# Patient Record
Sex: FEMALE | Race: WHITE | Employment: UNEMPLOYED | URBAN - METROPOLITAN AREA
[De-identification: names, ages, dates, MRNs, and addresses within clinical notes are randomized per-mention and may not be internally consistent; named-entity substitution may affect disease eponyms.]

---

## 2016-02-02 LAB — T. PALLIDUM, EXTERNAL: NORMAL

## 2016-06-08 LAB
CHLAMYDIA, EXTERNAL: NEGATIVE
N. GONORRHEA, EXTERNAL: NEGATIVE

## 2016-09-09 LAB
HBSAG, EXTERNAL: NEGATIVE
HIV, EXTERNAL: NONREACTIVE
RUBELLA, EXTERNAL: NORMAL
TYPE, ABO & RH, EXTERNAL: NORMAL

## 2017-02-02 LAB
ANTIBODY SCREEN, EXTERNAL: NORMAL
T. PALLIDUM, EXTERNAL: NEGATIVE

## 2017-03-29 LAB — GRBS, EXTERNAL: NORMAL

## 2017-04-28 ENCOUNTER — ANESTHESIA EVENT (OUTPATIENT)
Dept: LABOR AND DELIVERY | Age: 32
DRG: 540 | End: 2017-04-28
Payer: MEDICAID

## 2017-04-28 ENCOUNTER — HOSPITAL ENCOUNTER (INPATIENT)
Age: 32
LOS: 3 days | Discharge: HOME | DRG: 540 | End: 2017-05-01
Attending: OBSTETRICS & GYNECOLOGY | Admitting: OBSTETRICS & GYNECOLOGY
Payer: MEDICAID

## 2017-04-28 ENCOUNTER — ANESTHESIA (OUTPATIENT)
Dept: LABOR AND DELIVERY | Age: 32
DRG: 540 | End: 2017-04-28
Payer: MEDICAID

## 2017-04-28 PROBLEM — Z34.90 PREGNANCY: Status: ACTIVE | Noted: 2017-04-28

## 2017-04-28 LAB
ABO + RH BLD: NORMAL
BASOPHILS # BLD AUTO: 0 K/UL (ref 0–0.1)
BASOPHILS # BLD: 0 % (ref 0–1)
BLOOD GROUP ANTIBODIES SERPL: NORMAL
EOSINOPHIL # BLD: 0.1 K/UL (ref 0–0.4)
EOSINOPHIL NFR BLD: 1 % (ref 0–7)
ERYTHROCYTE [DISTWIDTH] IN BLOOD BY AUTOMATED COUNT: 14.3 % (ref 11.5–14.5)
HCT VFR BLD AUTO: 35.5 % (ref 35–47)
HGB BLD-MCNC: 11.9 G/DL (ref 11.5–16)
LYMPHOCYTES # BLD AUTO: 20 % (ref 12–49)
LYMPHOCYTES # BLD: 3 K/UL (ref 0.8–3.5)
MCH RBC QN AUTO: 30.1 PG (ref 26–34)
MCHC RBC AUTO-ENTMCNC: 33.5 G/DL (ref 30–36.5)
MCV RBC AUTO: 89.6 FL (ref 80–99)
MONOCYTES # BLD: 0.8 K/UL (ref 0–1)
MONOCYTES NFR BLD AUTO: 6 % (ref 5–13)
NEUTS SEG # BLD: 10.9 K/UL (ref 1.8–8)
NEUTS SEG NFR BLD AUTO: 73 % (ref 32–75)
PLATELET # BLD AUTO: 354 K/UL (ref 150–400)
RBC # BLD AUTO: 3.96 M/UL (ref 3.8–5.2)
SPECIMEN EXP DATE BLD: NORMAL
WBC # BLD AUTO: 14.8 K/UL (ref 3.6–11)

## 2017-04-28 PROCEDURE — 77010026065 HC OXYGEN MINIMUM MEDICAL AIR

## 2017-04-28 PROCEDURE — 74011250636 HC RX REV CODE- 250/636: Performed by: OBSTETRICS & GYNECOLOGY

## 2017-04-28 PROCEDURE — 76010000391 HC C SECN FIRST 1 HR: Performed by: OBSTETRICS & GYNECOLOGY

## 2017-04-28 PROCEDURE — 77030002933 HC SUT MCRYL J&J -A

## 2017-04-28 PROCEDURE — 65410000002 HC RM PRIVATE OB

## 2017-04-28 PROCEDURE — 77030031139 HC SUT VCRL2 J&J -A

## 2017-04-28 PROCEDURE — 75410000002 HC LABOR FEE PER 1 HR

## 2017-04-28 PROCEDURE — 76060000033 HC ANESTHESIA 1 TO 1.5 HR: Performed by: OBSTETRICS & GYNECOLOGY

## 2017-04-28 PROCEDURE — 74011250636 HC RX REV CODE- 250/636

## 2017-04-28 PROCEDURE — 74011000250 HC RX REV CODE- 250

## 2017-04-28 PROCEDURE — 74011250637 HC RX REV CODE- 250/637: Performed by: OBSTETRICS & GYNECOLOGY

## 2017-04-28 PROCEDURE — 76060000078 HC EPIDURAL ANESTHESIA: Performed by: OBSTETRICS & GYNECOLOGY

## 2017-04-28 PROCEDURE — 77030003666 HC NDL SPINAL BD -A: Performed by: ANESTHESIOLOGY

## 2017-04-28 PROCEDURE — 77030014125 HC TY EPDRL BBMI -B: Performed by: ANESTHESIOLOGY

## 2017-04-28 PROCEDURE — 76010000392 HC C SECN EA ADDL 0.5 HR: Performed by: OBSTETRICS & GYNECOLOGY

## 2017-04-28 PROCEDURE — 85025 COMPLETE CBC W/AUTO DIFF WBC: CPT | Performed by: OBSTETRICS & GYNECOLOGY

## 2017-04-28 PROCEDURE — 75410000003 HC RECOV DEL/VAG/CSECN EA 0.5 HR

## 2017-04-28 PROCEDURE — 86900 BLOOD TYPING SEROLOGIC ABO: CPT | Performed by: OBSTETRICS & GYNECOLOGY

## 2017-04-28 PROCEDURE — 36415 COLL VENOUS BLD VENIPUNCTURE: CPT | Performed by: OBSTETRICS & GYNECOLOGY

## 2017-04-28 RX ORDER — NALOXONE HYDROCHLORIDE 0.4 MG/ML
0.2 INJECTION, SOLUTION INTRAMUSCULAR; INTRAVENOUS; SUBCUTANEOUS
Status: DISCONTINUED | OUTPATIENT
Start: 2017-04-28 | End: 2017-05-01 | Stop reason: HOSPADM

## 2017-04-28 RX ORDER — OXYTOCIN/RINGER'S LACTATE 20/1000 ML
125-500 PLASTIC BAG, INJECTION (ML) INTRAVENOUS ONCE
Status: ACTIVE | OUTPATIENT
Start: 2017-04-28 | End: 2017-04-29

## 2017-04-28 RX ORDER — PHENYLEPHRINE HCL IN 0.9% NACL 0.4MG/10ML
SYRINGE (ML) INTRAVENOUS AS NEEDED
Status: DISCONTINUED | OUTPATIENT
Start: 2017-04-28 | End: 2017-04-28 | Stop reason: HOSPADM

## 2017-04-28 RX ORDER — ACETAMINOPHEN 500 MG
1000 TABLET ORAL
COMMUNITY
End: 2017-05-01

## 2017-04-28 RX ORDER — SODIUM CHLORIDE 0.9 % (FLUSH) 0.9 %
5-10 SYRINGE (ML) INJECTION AS NEEDED
Status: DISCONTINUED | OUTPATIENT
Start: 2017-04-28 | End: 2017-05-01 | Stop reason: HOSPADM

## 2017-04-28 RX ORDER — ONDANSETRON 2 MG/ML
INJECTION INTRAMUSCULAR; INTRAVENOUS AS NEEDED
Status: DISCONTINUED | OUTPATIENT
Start: 2017-04-28 | End: 2017-04-28 | Stop reason: HOSPADM

## 2017-04-28 RX ORDER — CEFAZOLIN SODIUM IN 0.9 % NACL 2 G/100 ML
PLASTIC BAG, INJECTION (ML) INTRAVENOUS
Status: COMPLETED
Start: 2017-04-28 | End: 2017-04-28

## 2017-04-28 RX ORDER — SODIUM CHLORIDE 0.9 % (FLUSH) 0.9 %
5-10 SYRINGE (ML) INJECTION AS NEEDED
Status: DISCONTINUED | OUTPATIENT
Start: 2017-04-28 | End: 2017-04-28 | Stop reason: HOSPADM

## 2017-04-28 RX ORDER — SODIUM CHLORIDE 0.9 % (FLUSH) 0.9 %
5-10 SYRINGE (ML) INJECTION EVERY 8 HOURS
Status: DISCONTINUED | OUTPATIENT
Start: 2017-04-28 | End: 2017-04-28 | Stop reason: HOSPADM

## 2017-04-28 RX ORDER — DEXAMETHASONE SODIUM PHOSPHATE 4 MG/ML
INJECTION, SOLUTION INTRA-ARTICULAR; INTRALESIONAL; INTRAMUSCULAR; INTRAVENOUS; SOFT TISSUE AS NEEDED
Status: DISCONTINUED | OUTPATIENT
Start: 2017-04-28 | End: 2017-04-28 | Stop reason: HOSPADM

## 2017-04-28 RX ORDER — SODIUM CHLORIDE, SODIUM LACTATE, POTASSIUM CHLORIDE, CALCIUM CHLORIDE 600; 310; 30; 20 MG/100ML; MG/100ML; MG/100ML; MG/100ML
1000 INJECTION, SOLUTION INTRAVENOUS CONTINUOUS
Status: DISCONTINUED | OUTPATIENT
Start: 2017-04-28 | End: 2017-04-28 | Stop reason: HOSPADM

## 2017-04-28 RX ORDER — ACETAMINOPHEN 10 MG/ML
INJECTION, SOLUTION INTRAVENOUS AS NEEDED
Status: DISCONTINUED | OUTPATIENT
Start: 2017-04-28 | End: 2017-04-28 | Stop reason: HOSPADM

## 2017-04-28 RX ORDER — SODIUM CHLORIDE, SODIUM LACTATE, POTASSIUM CHLORIDE, CALCIUM CHLORIDE 600; 310; 30; 20 MG/100ML; MG/100ML; MG/100ML; MG/100ML
125 INJECTION, SOLUTION INTRAVENOUS CONTINUOUS
Status: DISCONTINUED | OUTPATIENT
Start: 2017-04-28 | End: 2017-05-01 | Stop reason: HOSPADM

## 2017-04-28 RX ORDER — SODIUM CHLORIDE 0.9 % (FLUSH) 0.9 %
5-10 SYRINGE (ML) INJECTION EVERY 8 HOURS
Status: DISCONTINUED | OUTPATIENT
Start: 2017-04-28 | End: 2017-05-01 | Stop reason: HOSPADM

## 2017-04-28 RX ORDER — KETOROLAC TROMETHAMINE 30 MG/ML
30 INJECTION, SOLUTION INTRAMUSCULAR; INTRAVENOUS
Status: COMPLETED | OUTPATIENT
Start: 2017-04-28 | End: 2017-04-28

## 2017-04-28 RX ORDER — FENTANYL CITRATE 50 UG/ML
INJECTION, SOLUTION INTRAMUSCULAR; INTRAVENOUS AS NEEDED
Status: DISCONTINUED | OUTPATIENT
Start: 2017-04-28 | End: 2017-04-28 | Stop reason: HOSPADM

## 2017-04-28 RX ORDER — NALOXONE HYDROCHLORIDE 0.4 MG/ML
0.4 INJECTION, SOLUTION INTRAMUSCULAR; INTRAVENOUS; SUBCUTANEOUS AS NEEDED
Status: DISCONTINUED | OUTPATIENT
Start: 2017-04-28 | End: 2017-05-01 | Stop reason: HOSPADM

## 2017-04-28 RX ORDER — OXYTOCIN 10 [USP'U]/ML
INJECTION, SOLUTION INTRAMUSCULAR; INTRAVENOUS
Status: DISPENSED
Start: 2017-04-28 | End: 2017-04-28

## 2017-04-28 RX ORDER — OXYTOCIN 10 [USP'U]/ML
INJECTION, SOLUTION INTRAMUSCULAR; INTRAVENOUS AS NEEDED
Status: DISCONTINUED | OUTPATIENT
Start: 2017-04-28 | End: 2017-04-28 | Stop reason: HOSPADM

## 2017-04-28 RX ORDER — SIMETHICONE 80 MG
80 TABLET,CHEWABLE ORAL AS NEEDED
Status: DISCONTINUED | OUTPATIENT
Start: 2017-04-28 | End: 2017-05-01 | Stop reason: HOSPADM

## 2017-04-28 RX ORDER — GLYCOPYRROLATE 0.2 MG/ML
INJECTION INTRAMUSCULAR; INTRAVENOUS AS NEEDED
Status: DISCONTINUED | OUTPATIENT
Start: 2017-04-28 | End: 2017-04-28 | Stop reason: HOSPADM

## 2017-04-28 RX ORDER — OXYCODONE AND ACETAMINOPHEN 5; 325 MG/1; MG/1
1 TABLET ORAL
Status: DISCONTINUED | OUTPATIENT
Start: 2017-04-28 | End: 2017-04-30

## 2017-04-28 RX ORDER — BISMUTH SUBSALICYLATE 262 MG
2 TABLET,CHEWABLE ORAL DAILY
COMMUNITY
End: 2018-08-06

## 2017-04-28 RX ORDER — IBUPROFEN 400 MG/1
800 TABLET ORAL EVERY 8 HOURS
Status: DISCONTINUED | OUTPATIENT
Start: 2017-04-28 | End: 2017-05-01 | Stop reason: HOSPADM

## 2017-04-28 RX ADMIN — IBUPROFEN 800 MG: 400 TABLET, FILM COATED ORAL at 18:34

## 2017-04-28 RX ADMIN — KETOROLAC TROMETHAMINE 30 MG: 30 INJECTION, SOLUTION INTRAMUSCULAR at 11:56

## 2017-04-28 RX ADMIN — DEXAMETHASONE SODIUM PHOSPHATE 4 MG: 4 INJECTION, SOLUTION INTRA-ARTICULAR; INTRALESIONAL; INTRAMUSCULAR; INTRAVENOUS; SOFT TISSUE at 09:45

## 2017-04-28 RX ADMIN — OXYCODONE HYDROCHLORIDE AND ACETAMINOPHEN 1 TABLET: 5; 325 TABLET ORAL at 21:35

## 2017-04-28 RX ADMIN — ONDANSETRON 4 MG: 2 INJECTION INTRAMUSCULAR; INTRAVENOUS at 09:45

## 2017-04-28 RX ADMIN — OXYCODONE HYDROCHLORIDE AND ACETAMINOPHEN 1 TABLET: 5; 325 TABLET ORAL at 17:27

## 2017-04-28 RX ADMIN — FENTANYL CITRATE 75 MCG: 50 INJECTION, SOLUTION INTRAMUSCULAR; INTRAVENOUS at 10:24

## 2017-04-28 RX ADMIN — Medication 40 MCG: at 09:51

## 2017-04-28 RX ADMIN — SODIUM CHLORIDE, SODIUM LACTATE, POTASSIUM CHLORIDE, AND CALCIUM CHLORIDE 1000 ML: 600; 310; 30; 20 INJECTION, SOLUTION INTRAVENOUS at 07:40

## 2017-04-28 RX ADMIN — GLYCOPYRROLATE 0.1 MG: 0.2 INJECTION INTRAMUSCULAR; INTRAVENOUS at 10:09

## 2017-04-28 RX ADMIN — FENTANYL CITRATE 25 MCG: 50 INJECTION, SOLUTION INTRAMUSCULAR; INTRAVENOUS at 10:10

## 2017-04-28 RX ADMIN — ACETAMINOPHEN 1000 MG: 10 INJECTION, SOLUTION INTRAVENOUS at 10:22

## 2017-04-28 RX ADMIN — GLYCOPYRROLATE 0.1 MG: 0.2 INJECTION INTRAMUSCULAR; INTRAVENOUS at 10:10

## 2017-04-28 RX ADMIN — SODIUM CHLORIDE, SODIUM LACTATE, POTASSIUM CHLORIDE, AND CALCIUM CHLORIDE 1000 ML: 600; 310; 30; 20 INJECTION, SOLUTION INTRAVENOUS at 09:25

## 2017-04-28 RX ADMIN — CEFAZOLIN 2 G: 10 INJECTION, POWDER, FOR SOLUTION INTRAVENOUS; PARENTERAL at 09:24

## 2017-04-28 RX ADMIN — SODIUM CHLORIDE, SODIUM LACTATE, POTASSIUM CHLORIDE, AND CALCIUM CHLORIDE 125 ML/HR: 600; 310; 30; 20 INJECTION, SOLUTION INTRAVENOUS at 14:28

## 2017-04-28 RX ADMIN — OXYTOCIN 20 UNITS: 10 INJECTION, SOLUTION INTRAMUSCULAR; INTRAVENOUS at 10:01

## 2017-04-28 RX ADMIN — SODIUM CHLORIDE, SODIUM LACTATE, POTASSIUM CHLORIDE, AND CALCIUM CHLORIDE 1000 ML: 600; 310; 30; 20 INJECTION, SOLUTION INTRAVENOUS at 06:43

## 2017-04-28 NOTE — PROGRESS NOTES
Report received from NEVIN Valencia RN. Care assumed at this time. Dr. Jaki Gomez has seen patient. 1090 Dr. Pedro Banerjee in room speaking to patient. 3665 pt walking back to the OR    1038 Pt to room 3302 for post partum recovery    1100 Report given to JOSH Boyd. Care turned over at this time.

## 2017-04-28 NOTE — IP AVS SNAPSHOT
[address] [facility] 
[phone] Patient: [facility] MRN: [id number] GBX:[id number]/[id number] You are allergic to the following Allergen Reactions Wellbutrin (Bupropion Hcl) Hives Recent Documentation Height Weight Breastfeeding? BMI OB Status Smoking Status 1.676 m 99.8 kg Unknown 35.51 kg/m2 Recent pregnancy Former Smoker Emergency Contacts Name Discharge Info Relation Home Work Mobile Tigist Corral  Mother [14] [phone] Jayce Houser DISCHARGE CAREGIVER [3] Boyfriend [17]   [phone] About your hospitalization You were admitted on:  April 28, 2017 You last received care in the:  [facility] You were discharged on:  May 1, 2017 Unit phone number:  [phone] Why you were hospitalized Your primary diagnosis was:  Not on File Your diagnoses also included:  Pregnancy Providers Seen During Your Hospitalizations Provider Role Specialty Primary office phone Diego Cazares MD Attending Provider Obstetrics & Gynecology [phone] Your Primary Care Physician (PCP) Primary Care Physician Office Phone Office Fax UNKNOWN, PROVIDER ** None ** ** None ** Follow-up Information Follow up With Details Comments Contact Info Provider Unknown   Patient not available to ask Diego Cazares MD In 1 week Follow up with [facility] in 1 week for blood pressure check and 6 weeks post partum; Call to schedule both appointments. [address] [facility] 
[phone] Current Discharge Medication List  
  
START taking these medications Dose & Instructions Dispensing Information Comments Morning Noon Evening Bedtime  
 docusate sodium 100 mg capsule Commonly known as:  Lucio Blanton Your last dose was: Your next dose is: Dose:  100 mg Take 1 Cap by mouth two (2) times a day for 30 days. Quantity:  60 Cap Refills:  0  
     
   
   
   
  
 ibuprofen 600 mg tablet Commonly known as:  MOTRIN Your last dose was: Your next dose is:    
   
   
 Dose:  600 mg Take 1 Tab by mouth every six (6) hours as needed for Pain. Quantity:  40 Tab Refills:  1  
     
   
   
   
  
 oxyCODONE-acetaminophen 5-325 mg per tablet Commonly known as:  PERCOCET Your last dose was: Your next dose is:    
   
   
 Dose:  1-2 Tab Take 1-2 Tabs by mouth every six (6) hours as needed for Pain. Max Daily Amount: 8 Tabs. Quantity:  40 Tab Refills:  0 CONTINUE these medications which have NOT CHANGED Dose & Instructions Dispensing Information Comments Morning Noon Evening Bedtime  
 multivitamin tablet Commonly known as:  ONE A DAY Your last dose was: Your next dose is:    
   
   
 Dose:  2 Tab Take 2 Tabs by mouth daily. Refills:  0 STOP taking these medications   
 acetaminophen 500 mg tablet Commonly known as:  TYLENOL Where to Get Your Medications Information on where to get these meds will be given to you by the nurse or doctor. ! Ask your nurse or doctor about these medications  
  docusate sodium 100 mg capsule  
 ibuprofen 600 mg tablet  
 oxyCODONE-acetaminophen 5-325 mg per tablet Discharge Instructions POST DELIVERY DISCHARGE INSTRUCTIONS Name: Suzette Delgado YOB: 1985 Primary Diagnosis: Active Problems: 
  Pregnancy (4/28/2017) General:  
 
Diet/Diet Restrictions: 
· Eight 8-ounce glasses of fluid daily (water, juices); avoid excessive caffeine intake. · Meals/snacks as desired which are high in fiber and carbohydrates and low in fat and cholesterol. Medications:  
 
 
 
Physical Activity / Restrictions / Safety: · Avoid heavy lifting, no more that 8 lbs. For 2-3 weeks;  
· Limit use of stairs to 2 times daily for the first week home. · No driving for one week. · Avoid intercourse 4-6 weeks, no douching or tampon use. · Check with obstetrician before starting or resuming an exercise program.   
 
Discharge Instructions/Special Treatment/Home Care Needs:  
 
· Continue prenatal vitamins. · Continue to use squirt bottle with warm water on your episiotomy after each bathroom use until bleeding stops. · If steri-strips applied to your incision, remove in 7-10 days. Call your doctor for the following: · Fever over 101 degrees by mouth. · Vaginal bleeding heavier than a normal menstrual period or clots larger than a golf ball. · Red streaks or increased swelling of legs, painful red streaks on your breast. 
· Painful urination, constipation and increased pain or swelling or discharge with your incision. · If you feel extremely anxious or overwhelmed. · If you have thoughts of harming yourself and/or your baby. Pain Management:  
 
· Take Acetaminophen (Tylenol) or Ibuprofen (Advil, Motrin), as directed for pain. · Use a warm Sitz bath 3 times daily to relieve episiotomy or hemorrhoidal discomfort. · For hemorrhoidal discomfort, use Tucks and Anusol cream as needed and directed. · Heating pad to  incision as needed. Follow-Up Care:  
 
Appointment with MD: Follow-up Appointments Procedures  FOLLOW UP VISIT Appointment in: One Week 1 week and 6 weeks 1 week and 6 weeks Standing Status:   Standing Number of Occurrences:   1 Order Specific Question:   Appointment in Answer: One Week Telephone number: [phone] Signed By: Cisco Taylor MD                                                                                                   Date: 2017 Time: 10:00 AM 
 
 Delivery (Postpartum Care): After Your Visit Your Care Instructions Your baby was delivered through a cut, called an incision, in your belly. This surgery is called a  delivery, or a . After childbirth (postpartum period), your body goes through many changes. In the next few weeks, your body will slowly heal. It can take 4 weeks or more for the incision from your surgery to heal. It is easy to get too tired and overwhelmed during the first weeks after your baby is born. You may feel emotional during this time. Changes in your hormones can shift your mood without warning. It is easy to get too tired and overwhelmed during the first weeks after childbirth. Take it easy on yourself. You may find it hard to meet the extra demands on your energy and time. Get rest whenever you can, accept help from others, and eat well and drink plenty of fluids. After a , you may have gas or need to burp a lot. You may have some light vaginal bleeding or spotting for up to 6 weeks after surgery. It is normal to have pain in the incision area off and on during the next 6 months. No driving for 1 week after delivery. No heavy lifting. No more than 8 lbs or the size of baby for 2-3 weeks. Limit use of stairs. 1-2 times per day for the first week after delivery. Follow-up care is a key part of your treatment and safety. Be sure to make and go to all appointments, and call your doctor if you are having problems. Its also a good idea to know your test results and keep a list of the medicines you take. Around 4 to 6 weeks after your baby's birth, you will have a follow-up visit with your doctor. This visit is your time to talk to your doctor about anything you are concerned or curious about. Keep a list of questions to bring to your postpartum visit. Your questions might be about: 
Changes in your breasts, such as lumps or soreness. When to expect your menstrual period to start again. What form of birth control is best for you. Weight you have put on during the pregnancy. Exercise options. What foods and drinks are best for you, especially if you are breast-feeding. Problems you might be having with breast-feeding. When you can have sex. Some women may want to talk about lubricants for the vagina. Any feelings of sadness or restlessness that you are having. How can you care for yourself at home? Be sure that you understand any instructions your doctor has given you after surgery. This will guide your activities and tell you what to watch for in the next few weeks. Vaginal bleeding and cramps After delivery, you will have a bloody discharge from the vagina. This will turn pink within a week and then white or yellow after about 10 days. It may last for 2 to 4 weeks or longer, until the uterus has healed. You may have cramps for the first few days after childbirth. These are normal and occur as the uterus shrinks to normal size. Take an over-the-counter pain medicine, such as acetaminophen (Tylenol), ibuprofen (Advil, Motrin), or naproxen (Aleve), for cramps. Read and follow all instructions on the label. Do not take aspirin, because it can cause more bleeding. Take other medicines exactly as prescribed. Call your doctor if you have any problems with your medicine. Incision You may have had staples removed while you were in the hospital. Keep the area clean, and wash it with water and mild soap. If you had stitches, they should dissolve on their own and should not need to be removed. Follow your doctor's instructions for cleaning the stitched area. If you have steri-strips (tape) the will come off on their own in 7-10 days. Breast-feeding and breast fullness Breast-feed your baby in positions that do not put pressure on your incision, such as side-lying or football-hold positions. Ask your nurse, doctor, midwife, or lactation specialist to show you these positions if you do not know what they are. Your breasts may overfill (engorge) in the first few days after delivery. To help milk flow and to relieve pain, warm your breasts in the shower or by using warm, moist towels before nursing. Express a small amount of milk to soften your breast near the nipple and then feed your baby. Put an ice or cold pack on your breast for a few minutes after nursing to reduce swelling and pain. Put a thin cloth between the ice pack and your skin. If you are not nursing, do not put warmth on your breasts or touch your breasts. Wear a tight bra or sports bra, and use an ice or cold pack on your breasts to reduce swelling and pain. Breast fullness usually lasts 3 to 4 days. Activity Eat a balanced diet. Do not try to lose weight by cutting calories. Keep taking your prenatal vitamins, or take a multivitamin. Get as much rest as you can. Try to take naps when your baby sleeps during the day. Get help with household chores from family or friends, if you can. Do not try to do it all yourself. Get some gentle exercise, such as walking, every day. Do not lift more than 10 pounds for the next 4 to 6 weeks. Do not have sex or use tampons until you have stopped bleeding and at least 4 to 6 weeks have passed since you gave birth. Talk to your doctor about birth control. You can get pregnant even before your period returns. Also, you can get pregnant while you are breast-feeding. Mental health It is normal to have some sadness, anxiety, sleeplessness, and mood swings after you go home. If you feel upset or hopeless for more than a few days, talk to your doctor. If you have any thoughts of hurting yourself or anyone elseincluding your babycall 911 or go to the emergency room. Many women get the \"baby blues\" during the first few days after childbirth. The \"baby blues\" usually peak around the fourth day and then ease up in less than 2 weeks.  If you have the \"baby blues\" for more than a few days, or if you have thoughts of hurting yourself or your baby, call your doctor right away. Constipation and hemorrhoids Drink plenty of fluids (8 to 10 glasses a day), especially water. · Eat plenty of fiber each day to avoid constipation. Include foods such as whole-grain breads and cereals, raw vegetables, raw and dried fruits, and beans. · If you have hemorrhoids or swelling or pain around the opening of your vagina, try using cold and heat. You can put ice or a cold pack on the area for 10 to 20 minutes at a time. Put a thin cloth between the ice and your skin. Also try sitting in a few inches of warm water (sitz bath) 3 times a day and after bowel movements. Drink plenty of fluids, enough so that your urine is light yellow or clear like water. If you have kidney, heart, or liver disease and have to limit fluids, talk with your doctor before you increase the amount of fluids you drink. When should you call for help? Call 911 anytime you think you may need emergency care. For example, call if: 
You are thinking of hurting yourself, your baby, or anyone else. You have sudden, severe pain in your belly. You pass out (lose consciousness). Call your doctor now or seek immediate medical care if: 
You have severe vaginal bleeding. You are passing blood clots and soaking through a pad each hour for 2 or more hours. Your vaginal bleeding seems to be getting heavier or is still bright red 4 days after delivery, or you pass blood clots larger than the size of a golf ball. You have increased redness, heat, or drainage in the incision area. You are dizzy or lightheaded, or you feel like you may faint. You are vomiting or cannot keep fluids down. You have a fever. You have new or more belly pain. You pass tissue (not just blood). The incision seems to be pulling open or starts bleeding. Your vaginal discharge smells bad. Your belly feels more tender or full and hard. You have pain or redness in one or both breasts. You feel sad, anxious, or hopeless for more than a few days. Where can you learn more? Go to [employer] Enter F285 in the search box to learn more about \" Delivery: After Your Visit\". or 
  
Go to [employer] Enter Q518  in the search box to learn more about \"Postpartum Care: After Your Visit\". This care instruction is for use with your licensed healthcare professional. If you have questions about a medical condition or this instruction, always ask your healthcare professional. Care instructions adapted by Kenia Castillo (which disclaims liability or warranty for this information) from Patymariam Krystle, [address] [facility] . [employer] disclaims any warranty or liability for your use of this information. Follow up with [facility] in 1 week for blood pressure check and 6 weeks post partum; Call to schedule both appointments. Discharge Orders None Digg Announcement We are excited to announce that we are making your provider's discharge notes available to you in Digg. You will see these notes when they are completed and signed by the physician that discharged you from your recent hospital stay. If you have any questions or concerns about any information you see in Digg, please call the Health Information Department where you were seen or reach out to your Primary Care Provider for more information about your plan of care. Introducing [facility]! Kenia Castillo introduces Digg patient portal. Now you can access parts of your medical record, email your doctor's office, and request medication refills online. 1. In your internet browser, go to [email or web address] 2. Click on the First Time User? Click Here link in the Sign In box. You will see the New Member Sign Up page. 3. Enter your [employer] Access Code exactly as it appears below. You will not need to use this code after youve completed the sign-up process. If you do not sign up before the expiration date, you must request a new code. · [employer] Access Code: [email or web address] Expires: 7/30/2017 11:52 AM 
 
4. Enter the last four digits of your Social Security Number (xxxx) and Date of Birth (mm/dd/yyyy) as indicated and click Submit. You will be taken to the next sign-up page. 5. Create a [employer] ID. This will be your [employer] login ID and cannot be changed, so think of one that is secure and easy to remember. 6. Create a [employer] password. You can change your password at any time. 7. Enter your Password Reset Question and Answer. This can be used at a later time if you forget your password. 8. Enter your e-mail address. You will receive e-mail notification when new information is available in [address] 9. Click Sign Up. You can now view and download portions of your medical record. 10. Click the Download Summary menu link to download a portable copy of your medical information. If you have questions, please visit the Frequently Asked Questions section of the [employer] website. Remember, [employer] is NOT to be used for urgent needs. For medical emergencies, dial 911. Now available from your iPhone and Android! General Information Please provide this summary of care documentation to your next provider. Patient Signature:  ____________________________________________________________ Date:  ____________________________________________________________  
  
Lisa Macario Provider Signature:  ____________________________________________________________ Date:  ____________________________________________________________

## 2017-04-28 NOTE — ANESTHESIA PREPROCEDURE EVALUATION
Anesthetic History   No history of anesthetic complications            Review of Systems / Medical History  Patient summary reviewed, nursing notes reviewed and pertinent labs reviewed    Pulmonary  Within defined limits                 Neuro/Psych   Within defined limits           Cardiovascular  Within defined limits                Exercise tolerance: >4 METS     GI/Hepatic/Renal  Within defined limits              Endo/Other  Within defined limits           Other Findings              Physical Exam    Airway  Mallampati: II  TM Distance: 4 - 6 cm  Neck ROM: normal range of motion   Mouth opening: Normal     Cardiovascular  Regular rate and rhythm,  S1 and S2 normal,  no murmur, click, rub, or gallop             Dental  No notable dental hx       Pulmonary  Breath sounds clear to auscultation               Abdominal  GI exam deferred       Other Findings            Anesthetic Plan    ASA: 2  Anesthesia type: spinal            Anesthetic plan and risks discussed with: Patient

## 2017-04-28 NOTE — PROGRESS NOTES
TRANSFER - OUT REPORT:    Verbal report given to SUE Lyle(name) on April Duffer  being transferred to MIU(unit) for routine post - op       Report consisted of patients Situation, Background, Assessment and   Recommendations(SBAR). Information from the following report(s) SBAR, Kardex, OR Summary, Procedure Summary, Intake/Output, MAR and Recent Results was reviewed with the receiving nurse. Lines:   Peripheral IV 04/28/17 Left; Inner Forearm (Active)        Opportunity for questions and clarification was provided.

## 2017-04-28 NOTE — H&P
EDC:2017  EGA: 40 weeks, 2 days      Primary Provider:  Colette Nieto MD      History of Present Illness:   at 36 2/7 wks with lga fetus. efw almost 4500 gm. AC >99%. After long discussion of risks pt has opted for primary c-s. Patient's Prenatal Care with Doctor of Record Demetrius Baumann MD Notable For -    *Note: C/S scheduled to begin at 8am 17 at [facility]  Gestational hypertension  Polyhydramnios ____  Anemia on FE pregnant  LGA at 20 wks, repeat growth at 28 wk__98%__; repeat growth 36wks___  Smoker encouraged to quit pregnant   lab screening  Normal pregnancy primigravida  ROUTINE GYN no problems  Family History of Breast Cancer          Impression & Recommendations:    Problem # 1:  LGA at 20 wks, repeat growth at 28 wk__98%__; repeat growth 36wks___ (MTH-975.00) (QFL72-U68.62x0)  Will proceed with primary c-s. Review of Systems     Except as noted in the HPI, the review of systems is negative for General, Breast, , CV, Resp, GI, Endo, MS, Derm, Neuro, Psych, Eyes, ENT, Allergy and Heme. Allergies    * SSRI'S (Moderate)      Medications Removed from Medication List        Flowsheet View for Follow-up Visit     Estimated weeks of        gestation:  40 2/7      Vital Signs      Contractions:  no  NST:   reactive         Physical Exam     General           General appearance:  no acute distress    Psych           Orientation:  oriented to time, place, and person          Mood:  no appearance of anxiety, depression, or agitation    Abdomen           Abdomen:  gravid            Impression & Recommendations:    Problem # 1:  LGA at 20 wks, repeat growth at 28 wk__98%__; repeat growth 36wks___ (AWE-955.60) (DXO71-I15.62x0)  Will proceed with primary c-s.       Medications (at conclusion of this visit)    2017 ONE-A-DAY WOMENS PRENATAL 1 CAPS (PRENAT-FE CARBONYL-FA-OMEGA 3 CAPS) Prescribed by non 681/450 Chris Black MD  2017 * DOUBLE ELECTRIC BREAST PUMP Use as directed  Dx: Z39.1  02/16/2017 TYLENOL PM EXTRA STRENGTH 500-25 MG ORAL TABS (DIPHENHYDRAMINE-APAP (SLEEP)) Prescribed by non [facility] MD          LABORATORY DATA   TEST DATE RESULT   Group B Strep culture 03/29/2017 Negative                                   (Group B Strep Culture Result Field)   Blood Type 09/09/2016 O                                             (Blood Type Result Field)   Rh 09/09/2016 Positive                                   (Rh Result Field)   Rhogam Inj Given     Tdap Vaccine Given 02/02/2017 Vacc. [address]   Antibody Screen 02/02/2017 Negative   Rubella  [facility] Reference Ranges On or After 3/10/14                  <0.90              Non-immune      0.90 - 0.99     Equivocal      >0.99              Immune    [facility] Reference Ranges  Before 3/10/14           <5                 Non-immune             5 - 9               Equivocal            >9                 Immune  [facility] Reference Ranges       < Or = 0.90       Negative             0.91-1.09          Equivocal            > Or = 1.10       Positive   09/09/2016     5.96     TPA (T Pallidum Antibodies) 02/02/2017 Negative   Serology (RPR)     HBsAg 09/09/2016 Negative   HIV 09/09/2016 Non Reactive   Hemoglobin 04/05/2017 11.1   Hematocrit 04/05/2017 32.1   Platelets 87/20/0266 331 X10E3/UL   TSH 05/21/2009 1.089   Urine Culture 04/12/2017 Negative   GC DNA Probe 06/08/2016 Negative   Chlamydia DNA 06/08/2016 Negative   PAP 06/08/2016 NIL   Flu Vaccine Given 10/11/2016 Vacc.  [facility]   HGBA1C     HGB Electro     T4, Free     BG Fasting     GTT 1H 50G 02/02/2017 118   GTT 1H 100G     GTT 2H 100G     GTT 3H 100G     Glucose Plasma     CF Accept or Decline 09/09/2016 declined   CF Screen Result 09/09/2016 Declined   Nuchal Trans 10/11/2016 Declined   AFP Only     Tetra 11/09/2016 Declined   AFP Serum     CVS 09/09/2016 declined   AFP Amniotic     Amnio Karyo     FISH     GC Culture     Chlamydia Cult     Ureaplasma     Mycoplasma     WBC 04/05/2017 14.1 X10E3/UL   RBC 04/05/2017 3.60 X10E6/UL   MCV 04/05/2017 89   MCH 04/05/2017 30.8   MCHC RBC 04/05/2017 34.6     ULTRASOUND DATA   TEST DATE RESULT   Estimated Fetal Weight 04/19/2017 7140^8937 g&grams                                     Weight % 04/19/2017 90^90% %&percent                                                BRIGHT 04/19/2017 11.05^19.3 cm&centimeters                    BPP 04/19/2017 8^8 [n/a]&Not applicable   Cervical Length (mm)             Electronically signed by Mary Patel MD on 04/28/2017 at 9:02 AM    ________________________________________________________________________

## 2017-04-28 NOTE — ROUTINE PROCESS
Bedside shift change report given to JACKELINE Peralta (oncoming nurse) by JONAS Antoine RN (offgoing nurse). Report included the following information SBAR, Procedure Summary, Intake/Output, MAR and Recent Results.

## 2017-04-28 NOTE — OP NOTES
Operative Note    Name: April [facility]   Medical Record Number: [id number]   YOB: 1985  Today's Date: 2017      Pre-operative Diagnosis: Primary C/S LGA    Post-operative Diagnosis: Delivered at 1000    Operation: Low Cervical Transverse Procedure(s):   SECTION    Surgeon(s):  MD Haven Butler MD    Anesthesia: Spinal    Prophylactic Antibiotics: Ancef  DVT Prophylaxis: Sequential Compression Devices         Fetal Description: phillips     Birth Information:   Information for the patient's :  Nahed Mena april [[id number]]   Delivery of a 4.2 kg Female [1] infant on 2017 at 10:00 AM. Apgars were 9 and 9. Umbilical Cord:     Umbilical Cord Events:     Placenta:  removal with  appearance. Amniotic Fluid Volume: Moderate     Amniotic Fluid Description:  Clear        Umbilical Cord:      Placenta:  manual removal    Specimens: none           Complications:  none    Procedure Detail:      After proper patient identification and consent, the patient was taken to the operating room, where spinal anesthesia was administered and found to be adequate. Spaulding catheter had been placed using sterile technique. The patient was prepped and draped in the normal sterile fashion. The abdomen was entered using the Pfannenstiel technique. The peritoneum was entered sharply well superior to the bladder without any apparent injury. The Adam self retaining retractor was placed. The bladder flap was created without difficulty. A low transverse uterine incision was made with the scalpel and extended with blunt finger dissection. Amniotomy was performed and the fluid was large amount clear. The babys head was then delivered atraumatically. The nose and mouth were suctioned. The cord was clamped and cut and the baby was handed off to Nursing staff in attendance. Placenta was then removed from the uterus.  The uterus was curettaged with a moist lap pad and cleared of all clots and debris. The uterine incision was closed with double layer 0-monocryl in running locking fashion with good hemostasis assured. The anterior pelvis was irrigated with warm normal saline and good hemostasis was again reassured throughout. The retractor was removed. The peritoneum and rectus muscles were reapproximated with 0-monocryl. The fascia was closed with 0 Vicryl in a running fashion. Good hemostasis was assured. The skin was closed with an insorb staple closure. The patient tolerated the procedure well. Sponge, lap, and needle counts were correct times three and the patient and baby were taken to recovery/postpartum room in stable condition.     Ariel Waldrop MD  April 28, 2017  12:45 PM

## 2017-04-28 NOTE — ANESTHESIA PROCEDURE NOTES
Spinal Block    Start time: 4/28/2017 9:38 AM  End time: 4/28/2017 9:44 AM  Performed by: Cornel Anderson by: Eunice Witt     Pre-procedure: Indications: primary anesthetic  Preanesthetic Checklist: risks and benefits discussed, site marked and timeout performed      Spinal Block:   Patient Position:  Seated  Prep Region:  Lumbar  Prep: DuraPrep      Location:  L2-3  Technique:  Single shot        Needle:   Needle Type:  Pencil-tip  Needle Gauge:  25 G  Attempts:  1      Events: CSF confirmed and no blood with aspiration        Assessment:  Insertion:  Uncomplicated  Patient tolerance:  Patient tolerated the procedure well with no immediate complications  1.4 mL 4.07% Spinal Marcaine with dextrose + 0.5mg Duramorph was deposited into the CSF.

## 2017-04-28 NOTE — LACTATION NOTE
This note was copied from a baby's chart. Lactation consult completed. LGA infant delivered by primary C/S, one void and one stool at delivery. Assisted mother with placing infant to breast for first feed. Infant nursed well X 25 minutes, LATCH score 7. Mother instructed on infant feeding cues, feeding frequency, proper positioning, and elements of a proper latch. Feeding card started and parents instructed on use. Will continue to monitor progress and assist as needed.

## 2017-04-28 NOTE — ANESTHESIA POSTPROCEDURE EVALUATION
Post-Anesthesia Evaluation and Assessment    Patient: Chantel Prakash MRN: [id number]  SSN: [id number]    YOB: 1985  Age: 29 y.o. Sex: female       Cardiovascular Function/Vital Signs  Visit Vitals    /65    Pulse 81    Temp 36.6 °C (97.8 °F)    Resp 14    Ht 5' 6\" (1.676 m)    Wt 99.8 kg (220 lb)    SpO2 100%    Breastfeeding No    BMI 35.51 kg/m2       Patient is status post spinal anesthesia for Procedure(s):   SECTION. Nausea/Vomiting: None    Postoperative hydration reviewed and adequate. Pain:  Pain Scale 1: Numeric (0 - 10) (17 0900)  Pain Intensity 1: 0 (17 0900)   Managed    Neurological Status: At baseline    Mental Status and Level of Consciousness: Arousable    Pulmonary Status:   O2 Device: Room air (17 1047)   Adequate oxygenation and airway patent    Complications related to anesthesia: None    Post-anesthesia assessment completed.  No concerns    Signed By: Sharon Carrasco MD     2017

## 2017-04-28 NOTE — PROGRESS NOTES
TRANSFER - IN REPORT:    Verbal report received from Wei Tomlinson RN(name) on April Duffer  being received from L&D(unit) for routine post - op      Report consisted of patients Situation, Background, Assessment and   Recommendations(SBAR). Information from the following report(s) SBAR, OR Summary, Procedure Summary, Intake/Output, MAR and Recent Results was reviewed with the receiving nurse. Opportunity for questions and clarification was provided. Assessment completed upon patients arrival to unit and care assumed.

## 2017-04-28 NOTE — PROGRESS NOTES
56 - 30 yo  admitted to L&D for scheduled  for gestational size. EFM and toco placed, consents reviewed and signed. 5542 - verbal bedside shift report to SUE Jennings RN. Turned over care of pt at this time.

## 2017-04-29 LAB
BASOPHILS # BLD AUTO: 0 K/UL (ref 0–0.1)
BASOPHILS # BLD: 0 % (ref 0–1)
EOSINOPHIL # BLD: 0 K/UL (ref 0–0.4)
EOSINOPHIL NFR BLD: 0 % (ref 0–7)
ERYTHROCYTE [DISTWIDTH] IN BLOOD BY AUTOMATED COUNT: 14.4 % (ref 11.5–14.5)
HCT VFR BLD AUTO: 25.3 % (ref 35–47)
HGB BLD-MCNC: 8.7 G/DL (ref 11.5–16)
LYMPHOCYTES # BLD AUTO: 21 % (ref 12–49)
LYMPHOCYTES # BLD: 3.6 K/UL (ref 0.8–3.5)
MCH RBC QN AUTO: 31.4 PG (ref 26–34)
MCHC RBC AUTO-ENTMCNC: 34.4 G/DL (ref 30–36.5)
MCV RBC AUTO: 91.3 FL (ref 80–99)
MONOCYTES # BLD: 1.1 K/UL (ref 0–1)
MONOCYTES NFR BLD AUTO: 7 % (ref 5–13)
NEUTS SEG # BLD: 12 K/UL (ref 1.8–8)
NEUTS SEG NFR BLD AUTO: 72 % (ref 32–75)
PLATELET # BLD AUTO: 316 K/UL (ref 150–400)
RBC # BLD AUTO: 2.77 M/UL (ref 3.8–5.2)
WBC # BLD AUTO: 16.7 K/UL (ref 3.6–11)

## 2017-04-29 PROCEDURE — 74011250636 HC RX REV CODE- 250/636: Performed by: OBSTETRICS & GYNECOLOGY

## 2017-04-29 PROCEDURE — 65410000002 HC RM PRIVATE OB

## 2017-04-29 PROCEDURE — 36415 COLL VENOUS BLD VENIPUNCTURE: CPT | Performed by: OBSTETRICS & GYNECOLOGY

## 2017-04-29 PROCEDURE — 74011250637 HC RX REV CODE- 250/637: Performed by: OBSTETRICS & GYNECOLOGY

## 2017-04-29 PROCEDURE — 85025 COMPLETE CBC W/AUTO DIFF WBC: CPT | Performed by: OBSTETRICS & GYNECOLOGY

## 2017-04-29 RX ORDER — DOCUSATE SODIUM 100 MG/1
100 CAPSULE, LIQUID FILLED ORAL DAILY
Status: DISCONTINUED | OUTPATIENT
Start: 2017-04-29 | End: 2017-05-01 | Stop reason: HOSPADM

## 2017-04-29 RX ADMIN — OXYCODONE HYDROCHLORIDE AND ACETAMINOPHEN 1 TABLET: 5; 325 TABLET ORAL at 21:06

## 2017-04-29 RX ADMIN — DOCUSATE SODIUM 100 MG: 100 CAPSULE, LIQUID FILLED ORAL at 11:03

## 2017-04-29 RX ADMIN — OXYCODONE HYDROCHLORIDE AND ACETAMINOPHEN 1 TABLET: 5; 325 TABLET ORAL at 01:49

## 2017-04-29 RX ADMIN — OXYCODONE HYDROCHLORIDE AND ACETAMINOPHEN 1 TABLET: 5; 325 TABLET ORAL at 16:30

## 2017-04-29 RX ADMIN — IBUPROFEN 800 MG: 400 TABLET, FILM COATED ORAL at 11:03

## 2017-04-29 RX ADMIN — SODIUM CHLORIDE, SODIUM LACTATE, POTASSIUM CHLORIDE, AND CALCIUM CHLORIDE 125 ML/HR: 600; 310; 30; 20 INJECTION, SOLUTION INTRAVENOUS at 00:18

## 2017-04-29 RX ADMIN — IBUPROFEN 800 MG: 400 TABLET, FILM COATED ORAL at 01:49

## 2017-04-29 RX ADMIN — OXYCODONE HYDROCHLORIDE AND ACETAMINOPHEN 1 TABLET: 5; 325 TABLET ORAL at 15:31

## 2017-04-29 RX ADMIN — OXYCODONE HYDROCHLORIDE AND ACETAMINOPHEN 1 TABLET: 5; 325 TABLET ORAL at 11:03

## 2017-04-29 RX ADMIN — OXYCODONE HYDROCHLORIDE AND ACETAMINOPHEN 1 TABLET: 5; 325 TABLET ORAL at 21:48

## 2017-04-29 RX ADMIN — OXYCODONE HYDROCHLORIDE AND ACETAMINOPHEN 1 TABLET: 5; 325 TABLET ORAL at 07:00

## 2017-04-29 RX ADMIN — IBUPROFEN 800 MG: 400 TABLET, FILM COATED ORAL at 19:33

## 2017-04-29 NOTE — PROGRESS NOTES
Post-Operative  Day 1    April Artiejace     Assessment: Post-Op day 1, stable    Plan:   1. Routine post-operative care   2. N/A     Information for the patient's :   [[id number]]   , Low Transverse   Patient doing well without significant complaint. Nausea and vomiting resolved, tolerating liquids, no flatus, boykin in place. Vitals:  Visit Vitals    /75 (BP 1 Location: Left arm, BP Patient Position: At rest)    Pulse 88    Temp 97.5 °F (36.4 °C)    Resp 18    Ht 5' 6\" (1.676 m)    Wt 99.8 kg (220 lb)    SpO2 99%    Breastfeeding Unknown    BMI 35.51 kg/m2     Temp (24hrs), Av.7 °F (36.5 °C), Min:97.3 °F (36.3 °C), Max:98 °F (36.7 °C)      Last 24hr Input/Output:    Intake/Output Summary (Last 24 hours) at 17 0954  Last data filed at 17 0650   Gross per 24 hour   Intake          2904.17 ml   Output             2575 ml   Net           329.17 ml          Exam:        Patient without distress. Lungs clear. Abdomen, bowel sounds present, soft, expected tenderness, fundus firm Wound dressing intact     Perineum normal lochia noted               Lower extremities are negative for swelling, cords or tenderness.     Labs:   Lab Results   Component Value Date/Time    WBC 16.7 2017 04:29 AM    WBC 14.8 2017 06:44 AM    HGB 8.7 2017 04:29 AM    HGB 11.9 2017 06:44 AM    HCT 25.3 2017 04:29 AM    HCT 35.5 2017 06:44 AM    PLATELET 471 9969 04:29 AM    PLATELET 358  06:44 AM       Recent Results (from the past 24 hour(s))   CBC WITH AUTOMATED DIFF    Collection Time: 17  4:29 AM   Result Value Ref Range    WBC 16.7 (H) 3.6 - 11.0 K/uL    RBC 2.77 (L) 3.80 - 5.20 M/uL    HGB 8.7 (L) 11.5 - 16.0 g/dL    HCT 25.3 (L) 35.0 - 47.0 %    MCV 91.3 80.0 - 99.0 FL    MCH 31.4 26.0 - 34.0 PG    MCHC 34.4 30.0 - 36.5 g/dL    RDW 14.4 11.5 - 14.5 %    PLATELET 999 050 - 001 K/uL    NEUTROPHILS 72 32 - 75 % LYMPHOCYTES 21 12 - 49 %    MONOCYTES 7 5 - 13 %    EOSINOPHILS 0 0 - 7 %    BASOPHILS 0 0 - 1 %    ABS. NEUTROPHILS 12.0 (H) 1.8 - 8.0 K/UL    ABS. LYMPHOCYTES 3.6 (H) 0.8 - 3.5 K/UL    ABS. MONOCYTES 1.1 (H) 0.0 - 1.0 K/UL    ABS. EOSINOPHILS 0.0 0.0 - 0.4 K/UL    ABS.  BASOPHILS 0.0 0.0 - 0.1 K/UL

## 2017-04-29 NOTE — LACTATION NOTE
This note was copied from a baby's chart. [facility] visit request as Mom had questions and concerns. Mom's nipples are tender and bright pink. Mom was suing 24 mm flanges which were small for her anatomy and Mom states uncomfortable. Increased sizes and Mom is currently using 30 mm flange on right and 27 mm flange on left side with improved comfort. Mom also using lanolin. Mom was also pumping more than 20 minutes with higher setting on symphony pump. Instructed on premie pumping, to increase strength of suction until uncomfortable and then reduce number of teardrops or bars in breastpump to a setting that is comfortable. Mom was using teardrop setting at 5 drops and bar setting at 4 bars. Encourage to pump no longer than 20 minutes, ( 15 minute premie setting followed by 5 minutes of standard setting.) Encourage to ask for assistance this evening as needed and has [facility] zone phone number for follow up in the morning. Mom staes she has history of mastitis and knows warning signs which wer reviewed again. Mom staes her tail of queen on right side tedns to swell. Encouraged warm compresses such as a warm washcloth to site until cloth is tepid prior to feeds, milk massage and may use cool compress for 10 minutes after feeding. Encouraged epmtying breast. Infant may be able to go to breast today. Mom has lactation resource number for discharge.

## 2017-04-29 NOTE — ROUTINE PROCESS
Bedside and Verbal shift change report given to JACKELINE Holloway (oncoming nurse) by Roe Valencia. Sherly Tomlinson RN (offgoing nurse). Report included the following information SBAR.

## 2017-04-29 NOTE — LACTATION NOTE
This note was copied from a baby's chart. Weight loss is 3.8% today. Infant has feeding after birth followed by 4 sleepy feedsa dn back to feeding with 4 more breastfeedings. LATCH scores of 7-5-8 and 8 are noted. Infant has voided twice and stooled 4 times. Encouraged continued frequent feeding and waking.

## 2017-04-29 NOTE — PROGRESS NOTES
Duramorph Follow-Up Note    1 Day Post-Op sp Procedure(s):   SECTION. /75 (BP 1 Location: Left arm, BP Patient Position: At rest)  Pulse 88  Temp 36.4 °C (97.5 °F)  Resp 18  Ht 5' 6\" (1.676 m)  Wt 99.8 kg (220 lb)  SpO2 99%  Breastfeeding? Unknown  BMI 35.51 kg/m2. Patient is POD-1 S/P intrathecal duramorph. Pain is moderately controlled  Patient reports no headache, fever, weakness or numbness. Epidural/spinal tap site is clean, dry and intact. No obvious Anesthesia complications noted. Plan:    Pain management as per primary service.

## 2017-04-30 PROCEDURE — 65410000002 HC RM PRIVATE OB

## 2017-04-30 PROCEDURE — 74011250637 HC RX REV CODE- 250/637: Performed by: OBSTETRICS & GYNECOLOGY

## 2017-04-30 RX ORDER — OXYCODONE AND ACETAMINOPHEN 5; 325 MG/1; MG/1
1-2 TABLET ORAL
Status: DISCONTINUED | OUTPATIENT
Start: 2017-04-30 | End: 2017-05-01 | Stop reason: HOSPADM

## 2017-04-30 RX ADMIN — OXYCODONE HYDROCHLORIDE AND ACETAMINOPHEN 1 TABLET: 5; 325 TABLET ORAL at 06:32

## 2017-04-30 RX ADMIN — IBUPROFEN 800 MG: 400 TABLET, FILM COATED ORAL at 20:19

## 2017-04-30 RX ADMIN — IBUPROFEN 800 MG: 400 TABLET, FILM COATED ORAL at 12:23

## 2017-04-30 RX ADMIN — OXYCODONE HYDROCHLORIDE AND ACETAMINOPHEN 1 TABLET: 5; 325 TABLET ORAL at 03:02

## 2017-04-30 RX ADMIN — OXYCODONE HYDROCHLORIDE AND ACETAMINOPHEN 2 TABLET: 5; 325 TABLET ORAL at 16:19

## 2017-04-30 RX ADMIN — OXYCODONE HYDROCHLORIDE AND ACETAMINOPHEN 1 TABLET: 5; 325 TABLET ORAL at 07:11

## 2017-04-30 RX ADMIN — OXYCODONE HYDROCHLORIDE AND ACETAMINOPHEN 2 TABLET: 5; 325 TABLET ORAL at 20:20

## 2017-04-30 RX ADMIN — IBUPROFEN 800 MG: 400 TABLET, FILM COATED ORAL at 04:22

## 2017-04-30 RX ADMIN — OXYCODONE HYDROCHLORIDE AND ACETAMINOPHEN 1 TABLET: 5; 325 TABLET ORAL at 12:06

## 2017-04-30 RX ADMIN — OXYCODONE HYDROCHLORIDE AND ACETAMINOPHEN 1 TABLET: 5; 325 TABLET ORAL at 11:19

## 2017-04-30 RX ADMIN — SIMETHICONE 80 MG: 80 TABLET, CHEWABLE ORAL at 11:30

## 2017-04-30 RX ADMIN — DOCUSATE SODIUM 100 MG: 100 CAPSULE, LIQUID FILLED ORAL at 11:30

## 2017-04-30 NOTE — PROGRESS NOTES
Post-Operative  Day 2    April Olinda     Assessment: Post-Op day 2, doing well    Plan:   1. Routine post-operative care    Information for the patient's :  Helder Lee april [[id number]]   , Low Transverse   Patient doing well without significant complaint. Nausea and vomiting resolved, tolerating liquids, passing flatus, voiding and ambulating without difficulty. Vitals:  Visit Vitals    /89 (BP 1 Location: Left arm, BP Patient Position: At rest)    Pulse 88    Temp 98.1 °F (36.7 °C)    Resp 18    Ht 5' 6\" (1.676 m)    Wt 99.8 kg (220 lb)    SpO2 99%    Breastfeeding Unknown    BMI 35.51 kg/m2     Temp (24hrs), Av.1 °F (36.7 °C), Min:97.9 °F (36.6 °C), Max:98.1 °F (36.7 °C)        Exam:        Patient without distress. Abdomen, bowel sounds present, soft, expected tenderness, fundus firm                Wound incision clean, dry and intact               Lower extremities are negative for swelling, cords or tenderness. Labs:   Lab Results   Component Value Date/Time    WBC 16.7 2017 04:29 AM    WBC 14.8 2017 06:44 AM    HGB 8.7 2017 04:29 AM    HGB 11.9 2017 06:44 AM    HCT 25.3 2017 04:29 AM    HCT 35.5 2017 06:44 AM    PLATELET 879  04:29 AM    PLATELET 013  06:44 AM       No results found for this or any previous visit (from the past 24 hour(s)).

## 2017-04-30 NOTE — ROUTINE PROCESS
Bedside and Verbal shift change report given to GERRY Mohr RN (oncoming nurse) by JONAS Antoine RN (offgoing nurse). Report included the following information SBAR, Procedure Summary, Intake/Output and MAR.

## 2017-04-30 NOTE — LACTATION NOTE
This note was copied from a baby's chart.  -8.7 % am wt assessed this am. 9 breastfeeding sessions with latch of 10-10-9. Mother encouraged to supplement breastfeeding with pumped breastmilk per NNP/Cruz due to weight loss. Few drops collected then reported to be emotional and teary and elected to provide formula to baby until ebm is expressed. Encouraged to breastfeed 1st, incorporating manual massage, compression and expression of milk instructed to lead each feeding. Benefits of increasing milk production as well as milk transfer to baby with this stimulation process reviewed. double pump pc. If formula used, support mothers choice. Comforted by encouragement/patience and practice. States she is feeling better. [facility] # provided. Expect success.  Call prn

## 2017-04-30 NOTE — ROUTINE PROCESS
Bedside shift change report given to JONAS Manzo RN (oncoming nurse) by JACKELINE Nuñez RN (offgoing nurse). Report included the following information SBAR, Procedure Summary, Intake/Output, MAR and Recent Results.

## 2017-05-01 VITALS
OXYGEN SATURATION: 99 % | RESPIRATION RATE: 18 BRPM | HEIGHT: 66 IN | DIASTOLIC BLOOD PRESSURE: 84 MMHG | SYSTOLIC BLOOD PRESSURE: 148 MMHG | TEMPERATURE: 98.5 F | WEIGHT: 220 LBS | BODY MASS INDEX: 35.36 KG/M2 | HEART RATE: 91 BPM

## 2017-05-01 PROCEDURE — 74011250637 HC RX REV CODE- 250/637: Performed by: OBSTETRICS & GYNECOLOGY

## 2017-05-01 RX ORDER — DOCUSATE SODIUM 100 MG/1
100 CAPSULE, LIQUID FILLED ORAL 2 TIMES DAILY
Qty: 60 CAP | Refills: 0 | Status: SHIPPED | OUTPATIENT
Start: 2017-05-01 | End: 2017-05-31

## 2017-05-01 RX ORDER — IBUPROFEN 600 MG/1
600 TABLET ORAL
Qty: 40 TAB | Refills: 1 | Status: SHIPPED | OUTPATIENT
Start: 2017-05-01 | End: 2018-08-06

## 2017-05-01 RX ORDER — OXYCODONE AND ACETAMINOPHEN 5; 325 MG/1; MG/1
1-2 TABLET ORAL
Qty: 40 TAB | Refills: 0 | Status: SHIPPED | OUTPATIENT
Start: 2017-05-01 | End: 2017-05-07

## 2017-05-01 RX ADMIN — OXYCODONE HYDROCHLORIDE AND ACETAMINOPHEN 2 TABLET: 5; 325 TABLET ORAL at 08:23

## 2017-05-01 RX ADMIN — OXYCODONE HYDROCHLORIDE AND ACETAMINOPHEN 2 TABLET: 5; 325 TABLET ORAL at 04:21

## 2017-05-01 RX ADMIN — DOCUSATE SODIUM 100 MG: 100 CAPSULE, LIQUID FILLED ORAL at 08:23

## 2017-05-01 RX ADMIN — OXYCODONE HYDROCHLORIDE AND ACETAMINOPHEN 2 TABLET: 5; 325 TABLET ORAL at 00:42

## 2017-05-01 RX ADMIN — IBUPROFEN 800 MG: 400 TABLET, FILM COATED ORAL at 04:21

## 2017-05-01 RX ADMIN — IBUPROFEN 800 MG: 400 TABLET, FILM COATED ORAL at 10:24

## 2017-05-01 NOTE — DISCHARGE INSTRUCTIONS
POST DELIVERY DISCHARGE INSTRUCTIONS    Name: Chantel Prakash  YOB: 1985  Primary Diagnosis: Active Problems:    Pregnancy (2017)        General:     Diet/Diet Restrictions:  · Eight 8-ounce glasses of fluid daily (water, juices); avoid excessive caffeine intake. · Meals/snacks as desired which are high in fiber and carbohydrates and low in fat and cholesterol. Medications:         Physical Activity / Restrictions / Safety:     · Avoid heavy lifting, no more that 8 lbs. For 2-3 weeks;   · Limit use of stairs to 2 times daily for the first week home. · No driving for one week. · Avoid intercourse 4-6 weeks, no douching or tampon use. · Check with obstetrician before starting or resuming an exercise program.      Discharge Instructions/Special Treatment/Home Care Needs:     · Continue prenatal vitamins. · Continue to use squirt bottle with warm water on your episiotomy after each bathroom use until bleeding stops. · If steri-strips applied to your incision, remove in 7-10 days. Call your doctor for the following:     · Fever over 101 degrees by mouth. · Vaginal bleeding heavier than a normal menstrual period or clots larger than a golf ball. · Red streaks or increased swelling of legs, painful red streaks on your breast.  · Painful urination, constipation and increased pain or swelling or discharge with your incision. · If you feel extremely anxious or overwhelmed. · If you have thoughts of harming yourself and/or your baby. Pain Management:     · Take Acetaminophen (Tylenol) or Ibuprofen (Advil, Motrin), as directed for pain. · Use a warm Sitz bath 3 times daily to relieve episiotomy or hemorrhoidal discomfort. · For hemorrhoidal discomfort, use Tucks and Anusol cream as needed and directed. · Heating pad to  incision as needed.      Follow-Up Care:     Appointment with MD:   Follow-up Appointments   Procedures    FOLLOW UP VISIT Appointment in: One Week 1 week and 6 weeks     1 week and 6 weeks     Standing Status:   Standing     Number of Occurrences:   1     Order Specific Question:   Appointment in     Answer: One Week     Telephone number: [phone]    Signed By: Cesario Vargas MD                                                                                                   Date: 2017 Time: 10:00 AM     Delivery (Postpartum Care): After Your Visit    Your Care Instructions    Your baby was delivered through a cut, called an incision, in your belly. This surgery is called a  delivery, or a . After childbirth (postpartum period), your body goes through many changes. In the next few weeks, your body will slowly heal. It can take 4 weeks or more for the incision from your surgery to heal. It is easy to get too tired and overwhelmed during the first weeks after your baby is born. You may feel emotional during this time. Changes in your hormones can shift your mood without warning. It is easy to get too tired and overwhelmed during the first weeks after childbirth. Take it easy on yourself. You may find it hard to meet the extra demands on your energy and time. Get rest whenever you can, accept help from others, and eat well and drink plenty of fluids. After a , you may have gas or need to burp a lot. You may have some light vaginal bleeding or spotting for up to 6 weeks after surgery. It is normal to have pain in the incision area off and on during the next 6 months. No driving for 1 week after delivery. No heavy lifting. No more than 8 lbs or the size of baby for 2-3 weeks. Limit use of stairs. 1-2 times per day for the first week after delivery. Follow-up care is a key part of your treatment and safety. Be sure to make and go to all appointments, and call your doctor if you are having problems. Its also a good idea to know your test results and keep a list of the medicines you take.     Around 4 to 6 weeks after your baby's birth, you will have a follow-up visit with your doctor. This visit is your time to talk to your doctor about anything you are concerned or curious about. Keep a list of questions to bring to your postpartum visit. Your questions might be about:  Changes in your breasts, such as lumps or soreness. When to expect your menstrual period to start again. What form of birth control is best for you. Weight you have put on during the pregnancy. Exercise options. What foods and drinks are best for you, especially if you are breast-feeding. Problems you might be having with breast-feeding. When you can have sex. Some women may want to talk about lubricants for the vagina. Any feelings of sadness or restlessness that you are having. How can you care for yourself at home? Be sure that you understand any instructions your doctor has given you after surgery. This will guide your activities and tell you what to watch for in the next few weeks. Vaginal bleeding and cramps  After delivery, you will have a bloody discharge from the vagina. This will turn pink within a week and then white or yellow after about 10 days. It may last for 2 to 4 weeks or longer, until the uterus has healed. You may have cramps for the first few days after childbirth. These are normal and occur as the uterus shrinks to normal size. Take an over-the-counter pain medicine, such as acetaminophen (Tylenol), ibuprofen (Advil, Motrin), or naproxen (Aleve), for cramps. Read and follow all instructions on the label. Do not take aspirin, because it can cause more bleeding. Take other medicines exactly as prescribed. Call your doctor if you have any problems with your medicine. Incision  You may have had staples removed while you were in the hospital. Keep the area clean, and wash it with water and mild soap. If you had stitches, they should dissolve on their own and should not need to be removed.  Follow your doctor's instructions for cleaning the stitched area. If you have steri-strips (tape) the will come off on their own in 7-10 days. Breast-feeding and breast fullness  Breast-feed your baby in positions that do not put pressure on your incision, such as side-lying or football-hold positions. Ask your nurse, doctor, midwife, or lactation specialist to show you these positions if you do not know what they are. Your breasts may overfill (engorge) in the first few days after delivery. To help milk flow and to relieve pain, warm your breasts in the shower or by using warm, moist towels before nursing. Express a small amount of milk to soften your breast near the nipple and then feed your baby. Put an ice or cold pack on your breast for a few minutes after nursing to reduce swelling and pain. Put a thin cloth between the ice pack and your skin. If you are not nursing, do not put warmth on your breasts or touch your breasts. Wear a tight bra or sports bra, and use an ice or cold pack on your breasts to reduce swelling and pain. Breast fullness usually lasts 3 to 4 days. Activity  Eat a balanced diet. Do not try to lose weight by cutting calories. Keep taking your prenatal vitamins, or take a multivitamin. Get as much rest as you can. Try to take naps when your baby sleeps during the day. Get help with household chores from family or friends, if you can. Do not try to do it all yourself. Get some gentle exercise, such as walking, every day. Do not lift more than 10 pounds for the next 4 to 6 weeks. Do not have sex or use tampons until you have stopped bleeding and at least 4 to 6 weeks have passed since you gave birth. Talk to your doctor about birth control. You can get pregnant even before your period returns. Also, you can get pregnant while you are breast-feeding. Mental health  It is normal to have some sadness, anxiety, sleeplessness, and mood swings after you go home.  If you feel upset or hopeless for more than a few days, talk to your doctor. If you have any thoughts of hurting yourself or anyone else--including your baby--call 911 or go to the emergency room. Many women get the \"baby blues\" during the first few days after childbirth. The \"baby blues\" usually peak around the fourth day and then ease up in less than 2 weeks. If you have the \"baby blues\" for more than a few days, or if you have thoughts of hurting yourself or your baby, call your doctor right away. Constipation and hemorrhoids  Drink plenty of fluids (8 to 10 glasses a day), especially water. · Eat plenty of fiber each day to avoid constipation. Include foods such as whole-grain breads and cereals, raw vegetables, raw and dried fruits, and beans. · If you have hemorrhoids or swelling or pain around the opening of your vagina, try using cold and heat. You can put ice or a cold pack on the area for 10 to 20 minutes at a time. Put a thin cloth between the ice and your skin. Also try sitting in a few inches of warm water (sitz bath) 3 times a day and after bowel movements. Drink plenty of fluids, enough so that your urine is light yellow or clear like water. If you have kidney, heart, or liver disease and have to limit fluids, talk with your doctor before you increase the amount of fluids you drink. When should you call for help? Call 911 anytime you think you may need emergency care. For example, call if:  You are thinking of hurting yourself, your baby, or anyone else. You have sudden, severe pain in your belly. You pass out (lose consciousness). Call your doctor now or seek immediate medical care if:  You have severe vaginal bleeding. You are passing blood clots and soaking through a pad each hour for 2 or more hours. Your vaginal bleeding seems to be getting heavier or is still bright red 4 days after delivery, or you pass blood clots larger than the size of a golf ball. You have increased redness, heat, or drainage in the incision area.   You are dizzy or lightheaded, or you feel like you may faint. You are vomiting or cannot keep fluids down. You have a fever. You have new or more belly pain. You pass tissue (not just blood). The incision seems to be pulling open or starts bleeding. Your vaginal discharge smells bad. Your belly feels more tender or full and hard. You have pain or redness in one or both breasts. You feel sad, anxious, or hopeless for more than a few days. Where can you learn more? Go to [email or web address]    Enter D664 in the search box to learn more about \" Delivery: After Your Visit\". or     Go to [email or web address]    Enter D622  in the search box to learn more about \"Postpartum Care: After Your Visit\". This care instruction is for use with your licensed healthcare professional. If you have questions about a medical condition or this instruction, always ask your healthcare professional. Care instructions adapted by [facility] (which disclaims liability or warranty for this information) from [place], [address] [place] [address] . [facility] disclaims any warranty or liability for your use of this information. Follow up with [facility] in 1 week for blood pressure check and 6 weeks post partum; Call to schedule both appointments.

## 2017-05-01 NOTE — DISCHARGE SUMMARY
Obstetrical Discharge Summary     Name: April [facility] MRN: [id number]  SSN: [id number]    YOB: 1985  Age: 29 y.o. Sex: female      Admit Date: 2017    Discharge Date: 2017     Admitting Physician: Lesly Willson MD     Attending Physician:  Lesly Willson MD     Admission Diagnoses: C/Section [facility]  Pregnancy    Discharge Diagnoses:   Information for the patient's :  Jayce Segal april [[id number]]   Delivery of a 4.2 kg female infant via , Low Transverse on 2017 at 10:00 AM  by . Apgars were 9 and 9. Gestational HTN    Female - [place]    Additional Diagnoses:   Hospital Problems  Never Reviewed          Codes Class Noted POA    Pregnancy ICD-10-CM: Z33.1  ICD-9-CM: V22.2  2017 Unknown             Lab Results   Component Value Date/Time    Rubella, External immune 5.96 2016    GrBStrep, External neg 2017       Hospital Course: Normal hospital course following the delivery. Asymptomatic with some mildly elevated BPs. Disposition at Discharge: Home or self care    Discharged Condition: Stable    Patient Instructions:   Current Discharge Medication List      START taking these medications    Details   ibuprofen (MOTRIN) 600 mg tablet Take 1 Tab by mouth every six (6) hours as needed for Pain. Qty: 40 Tab, Refills: 1      oxyCODONE-acetaminophen (PERCOCET) 5-325 mg per tablet Take 1-2 Tabs by mouth every six (6) hours as needed for Pain. Max Daily Amount: 8 Tabs. Qty: 40 Tab, Refills: 0      docusate sodium (COLACE) 100 mg capsule Take 1 Cap by mouth two (2) times a day for 30 days. Qty: 60 Cap, Refills: 0         CONTINUE these medications which have NOT CHANGED    Details   multivitamin (ONE A DAY) tablet Take 2 Tabs by mouth daily. STOP taking these medications       acetaminophen (TYLENOL) 500 mg tablet Comments:   Reason for Stopping:               Reference my discharge instructions.     Follow-up Appointments   Procedures    FOLLOW UP VISIT Appointment in: One Week 1 week and 6 weeks     1 week and 6 weeks     Standing Status:   Standing     Number of Occurrences:   1     Order Specific Question:   Appointment in     Answer:    One Week        Signed By:  Emiliano Street MD     May 1, 2017

## 2017-05-01 NOTE — PROGRESS NOTES
Post-Operative  Day 3    April Olinda       Assessment: Post-Op day 3, doing well  Gestational HTN    Plan:   1. Discharge home today  2. Follow up in office in 1 week for BP check and 6 weeks with Edwina Mcdonough MD  3. Post partum activity/wound care advised, diet as tolerated  4. Discharge Medications: ibuprofen, percocet, colace and medications prior to admission      Information for the patient's :  Tian Reyes april [[id number]]   , Low Transverse   Patient doing well without significant complaint. Tolerating diet, passing flatus, voiding and ambulating without difficulty. Denies HA, vision changes or RUQ pain. Vitals:  Visit Vitals    /84 (BP 1 Location: Left arm, BP Patient Position: Sitting)    Pulse 91    Temp 98.5 °F (36.9 °C)    Resp 18    Ht 5' 6\" (1.676 m)    Wt 99.8 kg (220 lb)    SpO2 99%    Breastfeeding Unknown    BMI 35.51 kg/m2     Temp (24hrs), Av.3 °F (36.8 °C), Min:98 °F (36.7 °C), Max:98.5 °F (36.9 °C)        Exam:        Patient without distress. Abdomen, bowel sounds present, soft, expected tenderness, fundus firm                Wound incision clean, dry and intact               Lower extremities 1+ edema, negative Eliceo's B/L    Labs:   Lab Results   Component Value Date/Time    WBC 16.7 2017 04:29 AM    WBC 14.8 2017 06:44 AM    HGB 8.7 2017 04:29 AM    HGB 11.9 2017 06:44 AM    HCT 25.3 2017 04:29 AM    HCT 35.5 2017 06:44 AM    PLATELET 481  04:29 AM    PLATELET 017  06:44 AM       No results found for this or any previous visit (from the past 24 hour(s)).

## 2017-05-07 ENCOUNTER — APPOINTMENT (OUTPATIENT)
Dept: ULTRASOUND IMAGING | Age: 32
End: 2017-05-07
Payer: MEDICAID

## 2017-05-07 ENCOUNTER — HOSPITAL ENCOUNTER (EMERGENCY)
Age: 32
Discharge: HOME | End: 2017-05-07
Attending: EMERGENCY MEDICINE
Payer: MEDICAID

## 2017-05-07 VITALS
OXYGEN SATURATION: 98 % | WEIGHT: 204 LBS | RESPIRATION RATE: 16 BRPM | SYSTOLIC BLOOD PRESSURE: 130 MMHG | BODY MASS INDEX: 32.78 KG/M2 | TEMPERATURE: 98.3 F | HEIGHT: 66 IN | HEART RATE: 84 BPM | DIASTOLIC BLOOD PRESSURE: 87 MMHG

## 2017-05-07 DIAGNOSIS — Z98.891 STATUS POST CESAREAN SECTION: ICD-10-CM

## 2017-05-07 DIAGNOSIS — N93.8 DUB (DYSFUNCTIONAL UTERINE BLEEDING): Primary | ICD-10-CM

## 2017-05-07 DIAGNOSIS — R10.84 ABDOMINAL PAIN, GENERALIZED: ICD-10-CM

## 2017-05-07 LAB
ALBUMIN SERPL BCP-MCNC: 3.2 G/DL (ref 3.5–5)
ALBUMIN/GLOB SERPL: 0.7 {RATIO} (ref 1.1–2.2)
ALP SERPL-CCNC: 124 U/L (ref 45–117)
ALT SERPL-CCNC: 23 U/L (ref 12–78)
ANION GAP BLD CALC-SCNC: 8 MMOL/L (ref 5–15)
AST SERPL W P-5'-P-CCNC: 15 U/L (ref 15–37)
BASOPHILS # BLD AUTO: 0 K/UL (ref 0–0.1)
BASOPHILS # BLD: 0 % (ref 0–1)
BILIRUB SERPL-MCNC: 0.3 MG/DL (ref 0.2–1)
BUN SERPL-MCNC: 12 MG/DL (ref 6–20)
BUN/CREAT SERPL: 16 (ref 12–20)
CALCIUM SERPL-MCNC: 8.6 MG/DL (ref 8.5–10.1)
CHLORIDE SERPL-SCNC: 109 MMOL/L (ref 97–108)
CO2 SERPL-SCNC: 25 MMOL/L (ref 21–32)
CREAT SERPL-MCNC: 0.74 MG/DL (ref 0.55–1.02)
EOSINOPHIL # BLD: 0.1 K/UL (ref 0–0.4)
EOSINOPHIL NFR BLD: 1 % (ref 0–7)
ERYTHROCYTE [DISTWIDTH] IN BLOOD BY AUTOMATED COUNT: 13.7 % (ref 11.5–14.5)
GLOBULIN SER CALC-MCNC: 4.3 G/DL (ref 2–4)
GLUCOSE SERPL-MCNC: 86 MG/DL (ref 65–100)
HCT VFR BLD AUTO: 32 % (ref 35–47)
HGB BLD-MCNC: 10.5 G/DL (ref 11.5–16)
LYMPHOCYTES # BLD AUTO: 29 % (ref 12–49)
LYMPHOCYTES # BLD: 2.4 K/UL (ref 0.8–3.5)
MCH RBC QN AUTO: 30 PG (ref 26–34)
MCHC RBC AUTO-ENTMCNC: 32.8 G/DL (ref 30–36.5)
MCV RBC AUTO: 91.4 FL (ref 80–99)
MONOCYTES # BLD: 0.2 K/UL (ref 0–1)
MONOCYTES NFR BLD AUTO: 3 % (ref 5–13)
NEUTS SEG # BLD: 5.6 K/UL (ref 1.8–8)
NEUTS SEG NFR BLD AUTO: 67 % (ref 32–75)
PLATELET # BLD AUTO: 565 K/UL (ref 150–400)
POTASSIUM SERPL-SCNC: 4.2 MMOL/L (ref 3.5–5.1)
PROT SERPL-MCNC: 7.5 G/DL (ref 6.4–8.2)
RBC # BLD AUTO: 3.5 M/UL (ref 3.8–5.2)
SODIUM SERPL-SCNC: 142 MMOL/L (ref 136–145)
WBC # BLD AUTO: 8.3 K/UL (ref 3.6–11)

## 2017-05-07 PROCEDURE — 99284 EMERGENCY DEPT VISIT MOD MDM: CPT

## 2017-05-07 PROCEDURE — 76830 TRANSVAGINAL US NON-OB: CPT

## 2017-05-07 PROCEDURE — 74011250636 HC RX REV CODE- 250/636: Performed by: PHYSICIAN ASSISTANT

## 2017-05-07 PROCEDURE — 96374 THER/PROPH/DIAG INJ IV PUSH: CPT

## 2017-05-07 PROCEDURE — 96375 TX/PRO/DX INJ NEW DRUG ADDON: CPT

## 2017-05-07 PROCEDURE — 74011250637 HC RX REV CODE- 250/637: Performed by: PHYSICIAN ASSISTANT

## 2017-05-07 PROCEDURE — 36415 COLL VENOUS BLD VENIPUNCTURE: CPT | Performed by: PHYSICIAN ASSISTANT

## 2017-05-07 PROCEDURE — 85025 COMPLETE CBC W/AUTO DIFF WBC: CPT | Performed by: PHYSICIAN ASSISTANT

## 2017-05-07 PROCEDURE — 76856 US EXAM PELVIC COMPLETE: CPT

## 2017-05-07 PROCEDURE — 80053 COMPREHEN METABOLIC PANEL: CPT | Performed by: PHYSICIAN ASSISTANT

## 2017-05-07 PROCEDURE — 96372 THER/PROPH/DIAG INJ SC/IM: CPT

## 2017-05-07 RX ORDER — METHYLERGONOVINE MALEATE 0.2 MG/ML
0.2 INJECTION INTRAVENOUS ONCE
Status: DISCONTINUED | OUTPATIENT
Start: 2017-05-07 | End: 2017-05-08 | Stop reason: HOSPADM

## 2017-05-07 RX ORDER — ONDANSETRON 4 MG/1
4 TABLET, ORALLY DISINTEGRATING ORAL
Status: COMPLETED | OUTPATIENT
Start: 2017-05-07 | End: 2017-05-07

## 2017-05-07 RX ORDER — OXYCODONE AND ACETAMINOPHEN 5; 325 MG/1; MG/1
1 TABLET ORAL
Status: COMPLETED | OUTPATIENT
Start: 2017-05-07 | End: 2017-05-07

## 2017-05-07 RX ORDER — OXYCODONE AND ACETAMINOPHEN 5; 325 MG/1; MG/1
1 TABLET ORAL
Qty: 12 TAB | Refills: 0 | Status: SHIPPED | OUTPATIENT
Start: 2017-05-07 | End: 2018-08-18

## 2017-05-07 RX ORDER — ONDANSETRON 2 MG/ML
4 INJECTION INTRAMUSCULAR; INTRAVENOUS
Status: COMPLETED | OUTPATIENT
Start: 2017-05-07 | End: 2017-05-07

## 2017-05-07 RX ORDER — HYDROMORPHONE HYDROCHLORIDE 1 MG/ML
1 INJECTION, SOLUTION INTRAMUSCULAR; INTRAVENOUS; SUBCUTANEOUS
Status: COMPLETED | OUTPATIENT
Start: 2017-05-07 | End: 2017-05-07

## 2017-05-07 RX ADMIN — OXYCODONE HYDROCHLORIDE AND ACETAMINOPHEN 1 TABLET: 5; 325 TABLET ORAL at 18:22

## 2017-05-07 RX ADMIN — HYDROMORPHONE HYDROCHLORIDE 1 MG: 1 INJECTION, SOLUTION INTRAMUSCULAR; INTRAVENOUS; SUBCUTANEOUS at 19:29

## 2017-05-07 RX ADMIN — ONDANSETRON 4 MG: 4 TABLET, ORALLY DISINTEGRATING ORAL at 18:22

## 2017-05-07 RX ADMIN — ONDANSETRON HYDROCHLORIDE 4 MG: 2 INJECTION, SOLUTION INTRAMUSCULAR; INTRAVENOUS at 19:26

## 2017-05-07 NOTE — ED NOTES
Assumed care of patient from triage. Patient reports having a  on . Vaginal bleeding slowed down after a couple of days post pregnancy. Patient reports increased bleeding starting last night as well as passing a blood clot that was \"dark red about the size of a lemon. \" Patient reports soaking through 1 pad every one to two hours and has bilateral lower quadrant pain. Patient resting comfortably in stretcher with visitor at bedside. Call bell within reach.
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
## 2017-05-08 NOTE — ED NOTES
MD and RN reviewed discharge instructions with the patient and spouse. The patient and spouse verbalized understanding. VSS, NAD noted. Pt AAOx4, respirations unlabored and regular, skin warm and dry. Steady balanced gait noted.

## 2017-11-27 NOTE — DISCHARGE INSTRUCTIONS
Vaginal Bleeding in Nonpregnant Women: Care Instructions  Your Care Instructions    Many women have bleeding or spotting between periods. Lots of things can cause it. You may bleed because of hormone problems, stress, or ovulation. Fibroids and IUDs (intrauterine devices) can also cause bleeding. If your bleeding or spotting is caused by one of these things and is not heavy or doesn't happen often, you probably don't need to worry. But in rare cases, infection, cancer, or other serious conditions can cause bleeding. So you may need more tests to find the cause of your bleeding. The doctor has checked you carefully, but problems can develop later. If you notice any problems or new symptoms, get medical treatment right away. Follow-up care is a key part of your treatment and safety. Be sure to make and go to all appointments, and call your doctor if you are having problems. It's also a good idea to know your test results and keep a list of the medicines you take. How can you care for yourself at home? · Take pain medicines exactly as directed. ¨ If the doctor gave you a prescription medicine for pain, take it as prescribed. ¨ If you are not taking a prescription pain medicine, ask your doctor if you can take an over-the-counter medicine. Do not take aspirin, which may make bleeding worse. · If your doctor prescribed birth control pills for your bleeding, take them as directed. · Eat foods that are high in iron and vitamin C. Foods high in iron include red meat, shellfish, eggs, beans, and leafy green vegetables. Foods high in vitamin C include citrus fruits, tomatoes, and broccoli. Ask your doctor if you need to take iron pills or a multivitamin. · Ask your doctor when it is okay to have sex. When should you call for help? Call 911 anytime you think you may need emergency care. For example, call if:  · You passed out (lost consciousness). · You have sudden, severe pain in your belly or pelvis.   Call your doctor now or seek immediate medical care if:  · You have severe vaginal bleeding. You are soaking through your usual pads or tampons each hour for 2 or more hours. · You are dizzy or lightheaded or you feel like you may faint. · You have new belly or pelvic pain. · You have a fever. · Your bleeding gets worse. Watch closely for changes in your health, and be sure to contact your doctor if:  · You have new or worse vaginal discharge. · You do not get better as expected. Where can you learn more? Go to http://franco-hattie.info/. Enter C658 in the search box to learn more about \"Vaginal Bleeding in Nonpregnant Women: Care Instructions. \"  Current as of: October 13, 2016  Content Version: 11.2  © 5587-9331 [employer]. Care instructions adapted under license by [employer] (which disclaims liability or warranty for this information). If you have questions about a medical condition or this instruction, always ask your healthcare professional. [address] any warranty or liability for your use of this information. No

## 2018-05-05 ENCOUNTER — APPOINTMENT (OUTPATIENT)
Dept: CT IMAGING | Age: 33
End: 2018-05-05
Attending: PHYSICIAN ASSISTANT
Payer: MEDICAID

## 2018-05-05 ENCOUNTER — HOSPITAL ENCOUNTER (EMERGENCY)
Age: 33
Discharge: HOME | End: 2018-05-05
Attending: EMERGENCY MEDICINE
Payer: MEDICAID

## 2018-05-05 VITALS
HEART RATE: 81 BPM | WEIGHT: 199.96 LBS | TEMPERATURE: 98.2 F | OXYGEN SATURATION: 99 % | RESPIRATION RATE: 19 BRPM | BODY MASS INDEX: 32.14 KG/M2 | SYSTOLIC BLOOD PRESSURE: 157 MMHG | HEIGHT: 66 IN | DIASTOLIC BLOOD PRESSURE: 99 MMHG

## 2018-05-05 DIAGNOSIS — B34.9 VIRAL SYNDROME: Primary | ICD-10-CM

## 2018-05-05 DIAGNOSIS — R19.7 DIARRHEA, UNSPECIFIED TYPE: ICD-10-CM

## 2018-05-05 DIAGNOSIS — R11.2 NON-INTRACTABLE VOMITING WITH NAUSEA, UNSPECIFIED VOMITING TYPE: ICD-10-CM

## 2018-05-05 LAB
ALBUMIN SERPL-MCNC: 3.9 G/DL (ref 3.5–5)
ALBUMIN/GLOB SERPL: 1.1 {RATIO} (ref 1.1–2.2)
ALP SERPL-CCNC: 76 U/L (ref 45–117)
ALT SERPL-CCNC: 28 U/L (ref 12–78)
ANION GAP SERPL CALC-SCNC: 6 MMOL/L (ref 5–15)
APPEARANCE UR: CLEAR
AST SERPL-CCNC: 13 U/L (ref 15–37)
BACTERIA URNS QL MICRO: ABNORMAL /HPF
BASOPHILS # BLD: 0 K/UL (ref 0–0.1)
BASOPHILS NFR BLD: 0 % (ref 0–1)
BILIRUB SERPL-MCNC: 0.3 MG/DL (ref 0.2–1)
BILIRUB UR QL: NEGATIVE
BUN SERPL-MCNC: 13 MG/DL (ref 6–20)
BUN/CREAT SERPL: 19 (ref 12–20)
CALCIUM SERPL-MCNC: 8.8 MG/DL (ref 8.5–10.1)
CHLORIDE SERPL-SCNC: 110 MMOL/L (ref 97–108)
CO2 SERPL-SCNC: 26 MMOL/L (ref 21–32)
COLOR UR: ABNORMAL
CREAT SERPL-MCNC: 0.67 MG/DL (ref 0.55–1.02)
DIFFERENTIAL METHOD BLD: ABNORMAL
EOSINOPHIL # BLD: 0 K/UL (ref 0–0.4)
EOSINOPHIL NFR BLD: 0 % (ref 0–7)
EPITH CASTS URNS QL MICRO: ABNORMAL /LPF
ERYTHROCYTE [DISTWIDTH] IN BLOOD BY AUTOMATED COUNT: 12.4 % (ref 11.5–14.5)
GLOBULIN SER CALC-MCNC: 3.4 G/DL (ref 2–4)
GLUCOSE SERPL-MCNC: 97 MG/DL (ref 65–100)
GLUCOSE UR STRIP.AUTO-MCNC: NEGATIVE MG/DL
HCG UR QL: NEGATIVE
HCT VFR BLD AUTO: 40.1 % (ref 35–47)
HGB BLD-MCNC: 13.7 G/DL (ref 11.5–16)
HGB UR QL STRIP: NEGATIVE
IMM GRANULOCYTES # BLD: 0 K/UL (ref 0–0.04)
IMM GRANULOCYTES NFR BLD AUTO: 0 % (ref 0–0.5)
KETONES UR QL STRIP.AUTO: ABNORMAL MG/DL
LACTATE SERPL-SCNC: 0.6 MMOL/L (ref 0.4–2)
LEUKOCYTE ESTERASE UR QL STRIP.AUTO: NEGATIVE
LYMPHOCYTES # BLD: 2.5 K/UL (ref 0.8–3.5)
LYMPHOCYTES NFR BLD: 21 % (ref 12–49)
MCH RBC QN AUTO: 31.6 PG (ref 26–34)
MCHC RBC AUTO-ENTMCNC: 34.2 G/DL (ref 30–36.5)
MCV RBC AUTO: 92.4 FL (ref 80–99)
MONOCYTES # BLD: 0.3 K/UL (ref 0–1)
MONOCYTES NFR BLD: 3 % (ref 5–13)
NEUTS SEG # BLD: 8.9 K/UL (ref 1.8–8)
NEUTS SEG NFR BLD: 76 % (ref 32–75)
NITRITE UR QL STRIP.AUTO: NEGATIVE
NRBC # BLD: 0 K/UL (ref 0–0.01)
NRBC BLD-RTO: 0 PER 100 WBC
PH UR STRIP: 6 [PH] (ref 5–8)
PLATELET # BLD AUTO: 317 K/UL (ref 150–400)
PMV BLD AUTO: 9.4 FL (ref 8.9–12.9)
POTASSIUM SERPL-SCNC: 3.7 MMOL/L (ref 3.5–5.1)
PROT SERPL-MCNC: 7.3 G/DL (ref 6.4–8.2)
PROT UR STRIP-MCNC: ABNORMAL MG/DL
RBC # BLD AUTO: 4.34 M/UL (ref 3.8–5.2)
RBC #/AREA URNS HPF: ABNORMAL /HPF (ref 0–5)
SODIUM SERPL-SCNC: 142 MMOL/L (ref 136–145)
SP GR UR REFRACTOMETRY: 1.03 (ref 1–1.03)
UA: UC IF INDICATED,UAUC: ABNORMAL
UROBILINOGEN UR QL STRIP.AUTO: 0.2 EU/DL (ref 0.2–1)
WBC # BLD AUTO: 11.7 K/UL (ref 3.6–11)
WBC URNS QL MICRO: ABNORMAL /HPF (ref 0–4)

## 2018-05-05 PROCEDURE — 87086 URINE CULTURE/COLONY COUNT: CPT | Performed by: EMERGENCY MEDICINE

## 2018-05-05 PROCEDURE — 81001 URINALYSIS AUTO W/SCOPE: CPT | Performed by: EMERGENCY MEDICINE

## 2018-05-05 PROCEDURE — 74177 CT ABD & PELVIS W/CONTRAST: CPT

## 2018-05-05 PROCEDURE — 99284 EMERGENCY DEPT VISIT MOD MDM: CPT

## 2018-05-05 PROCEDURE — 96374 THER/PROPH/DIAG INJ IV PUSH: CPT

## 2018-05-05 PROCEDURE — 96361 HYDRATE IV INFUSION ADD-ON: CPT

## 2018-05-05 PROCEDURE — 74011250636 HC RX REV CODE- 250/636: Performed by: EMERGENCY MEDICINE

## 2018-05-05 PROCEDURE — 83605 ASSAY OF LACTIC ACID: CPT

## 2018-05-05 PROCEDURE — 74011250636 HC RX REV CODE- 250/636: Performed by: PHYSICIAN ASSISTANT

## 2018-05-05 PROCEDURE — 74011636320 HC RX REV CODE- 636/320: Performed by: PHYSICIAN ASSISTANT

## 2018-05-05 PROCEDURE — 80053 COMPREHEN METABOLIC PANEL: CPT

## 2018-05-05 PROCEDURE — 74011636320 HC RX REV CODE- 636/320: Performed by: EMERGENCY MEDICINE

## 2018-05-05 PROCEDURE — 85025 COMPLETE CBC W/AUTO DIFF WBC: CPT

## 2018-05-05 PROCEDURE — 96375 TX/PRO/DX INJ NEW DRUG ADDON: CPT

## 2018-05-05 PROCEDURE — 81025 URINE PREGNANCY TEST: CPT

## 2018-05-05 PROCEDURE — 36415 COLL VENOUS BLD VENIPUNCTURE: CPT

## 2018-05-05 RX ORDER — SODIUM CHLORIDE 0.9 % (FLUSH) 0.9 %
10 SYRINGE (ML) INJECTION
Status: COMPLETED | OUTPATIENT
Start: 2018-05-05 | End: 2018-05-05

## 2018-05-05 RX ORDER — LOPERAMIDE HYDROCHLORIDE 2 MG/1
2 CAPSULE ORAL
Qty: 20 CAP | Refills: 0 | Status: SHIPPED | OUTPATIENT
Start: 2018-05-05 | End: 2018-08-06

## 2018-05-05 RX ORDER — KETOROLAC TROMETHAMINE 30 MG/ML
15 INJECTION, SOLUTION INTRAMUSCULAR; INTRAVENOUS ONCE
Status: COMPLETED | OUTPATIENT
Start: 2018-05-05 | End: 2018-05-05

## 2018-05-05 RX ORDER — ONDANSETRON 4 MG/1
4 TABLET, ORALLY DISINTEGRATING ORAL
Qty: 10 TAB | Refills: 0 | Status: SHIPPED | OUTPATIENT
Start: 2018-05-05 | End: 2018-08-06

## 2018-05-05 RX ORDER — SODIUM CHLORIDE 9 MG/ML
50 INJECTION, SOLUTION INTRAVENOUS
Status: COMPLETED | OUTPATIENT
Start: 2018-05-05 | End: 2018-05-05

## 2018-05-05 RX ORDER — MORPHINE SULFATE 10 MG/ML
6 INJECTION, SOLUTION INTRAMUSCULAR; INTRAVENOUS
Status: COMPLETED | OUTPATIENT
Start: 2018-05-05 | End: 2018-05-05

## 2018-05-05 RX ADMIN — IOPAMIDOL 100 ML: 755 INJECTION, SOLUTION INTRAVENOUS at 21:56

## 2018-05-05 RX ADMIN — Medication 10 ML: at 21:56

## 2018-05-05 RX ADMIN — MORPHINE SULFATE 6 MG: 10 INJECTION INTRAVENOUS at 21:35

## 2018-05-05 RX ADMIN — DIATRIZOATE MEGLUMINE AND DIATRIZOATE SODIUM 30 ML: 600; 100 SOLUTION ORAL; RECTAL at 19:19

## 2018-05-05 RX ADMIN — SODIUM CHLORIDE 50 ML/HR: 900 INJECTION, SOLUTION INTRAVENOUS at 21:56

## 2018-05-05 RX ADMIN — KETOROLAC TROMETHAMINE 15 MG: 30 INJECTION, SOLUTION INTRAMUSCULAR at 19:19

## 2018-05-05 RX ADMIN — SODIUM CHLORIDE 1000 ML: 900 INJECTION, SOLUTION INTRAVENOUS at 19:21

## 2018-05-05 NOTE — ED NOTES
Patient given oral contrast for CT.  Pillow and blanket given call bell within reach and mother at bedside
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
## 2018-05-06 NOTE — ED NOTES
MD Carlton reviewed discharge instructions with the patient.  Patient ambulatory out of treatment area with steady gait and states that her spouse will drive her home

## 2018-05-06 NOTE — DISCHARGE INSTRUCTIONS
Diarrhea: Care Instructions  Your Care Instructions    Diarrhea is loose, watery stools (bowel movements). The exact cause is often hard to find. Sometimes diarrhea is your body's way of getting rid of what caused an upset stomach. Viruses, food poisoning, and many medicines can cause diarrhea. Some people get diarrhea in response to emotional stress, anxiety, or certain foods. Almost everyone has diarrhea now and then. It usually isn't serious, and your stools will return to normal soon. The important thing to do is replace the fluids you have lost, so you can prevent dehydration. The doctor has checked you carefully, but problems can develop later. If you notice any problems or new symptoms, get medical treatment right away. Follow-up care is a key part of your treatment and safety. Be sure to make and go to all appointments, and call your doctor if you are having problems. It's also a good idea to know your test results and keep a list of the medicines you take. How can you care for yourself at home? · Watch for signs of dehydration, which means your body has lost too much water. Dehydration is a serious condition and should be treated right away. Signs of dehydration are:  ¨ Increasing thirst and dry eyes and mouth. ¨ Feeling faint or lightheaded. ¨ Darker urine, and a smaller amount of urine than normal.  · To prevent dehydration, drink plenty of fluids, enough so that your urine is light yellow or clear like water. Choose water and other caffeine-free clear liquids until you feel better. If you have kidney, heart, or liver disease and have to limit fluids, talk with your doctor before you increase the amount of fluids you drink. · Begin eating small amounts of mild foods the next day, if you feel like it. ¨ Try yogurt that has live cultures of Lactobacillus. (Check the label.)  ¨ Avoid spicy foods, fruits, alcohol, and caffeine until 48 hours after all symptoms are gone.   ¨ Avoid chewing gum that contains sorbitol. ¨ Avoid dairy products (except for yogurt with Lactobacillus) while you have diarrhea and for 3 days after symptoms are gone. · The doctor may recommend that you take over-the-counter medicine, such as loperamide (Imodium), if you still have diarrhea after 6 hours. Read and follow all instructions on the label. Do not use this medicine if you have bloody diarrhea, a high fever, or other signs of serious illness. Call your doctor if you think you are having a problem with your medicine. When should you call for help? Call 911 anytime you think you may need emergency care. For example, call if:  ? · You passed out (lost consciousness). ? · Your stools are maroon or very bloody. ?Call your doctor now or seek immediate medical care if:  ? · You are dizzy or lightheaded, or you feel like you may faint. ? · Your stools are black and look like tar, or they have streaks of blood. ? · You have new or worse belly pain. ? · You have symptoms of dehydration, such as:  ¨ Dry eyes and a dry mouth. ¨ Passing only a little dark urine. ¨ Feeling thirstier than usual.   ? · You have a new or higher fever. ? Watch closely for changes in your health, and be sure to contact your doctor if:  ? · Your diarrhea is getting worse. ? · You see pus in the diarrhea. ? · You are not getting better after 2 days (48 hours). Where can you learn more? Go to http://franco-hattie.info/. Enter X312 in the search box to learn more about \"Diarrhea: Care Instructions. \"  Current as of: March 20, 2017  Content Version: 11.4  © 6928-8089 [employer]. Care instructions adapted under license by [employer] (which disclaims liability or warranty for this information). If you have questions about a medical condition or this instruction, always ask your healthcare professional. [address] any warranty or liability for your use of this information. Nausea and Vomiting: Care Instructions  Your Care Instructions    When you are nauseated, you may feel weak and sweaty and notice a lot of saliva in your mouth. Nausea often leads to vomiting. Most of the time you do not need to worry about nausea and vomiting, but they can be signs of other illnesses. Two common causes of nausea and vomiting are stomach flu and food poisoning. Nausea and vomiting from viral stomach flu will usually start to improve within 24 hours. Nausea and vomiting from food poisoning may last from 12 to 48 hours. The doctor has checked you carefully, but problems can develop later. If you notice any problems or new symptoms, get medical treatment right away. Follow-up care is a key part of your treatment and safety. Be sure to make and go to all appointments, and call your doctor if you are having problems. It's also a good idea to know your test results and keep a list of the medicines you take. How can you care for yourself at home? · To prevent dehydration, drink plenty of fluids, enough so that your urine is light yellow or clear like water. Choose water and other caffeine-free clear liquids until you feel better. If you have kidney, heart, or liver disease and have to limit fluids, talk with your doctor before you increase the amount of fluids you drink. · Rest in bed until you feel better. · When you are able to eat, try clear soups, mild foods, and liquids until all symptoms are gone for 12 to 48 hours. Other good choices include dry toast, crackers, cooked cereal, and gelatin dessert, such as Jell-O. When should you call for help? Call 911 anytime you think you may need emergency care. For example, call if:  ? · You passed out (lost consciousness). ?Call your doctor now or seek immediate medical care if:  ? · You have symptoms of dehydration, such as:  ¨ Dry eyes and a dry mouth. ¨ Passing only a little dark urine.   ¨ Feeling thirstier than usual.   ? · You have new or worsening belly pain. ? · You have a new or higher fever. ? · You vomit blood or what looks like coffee grounds. ? Watch closely for changes in your health, and be sure to contact your doctor if:  ? · You have ongoing nausea and vomiting. ? · Your vomiting is getting worse. ? · Your vomiting lasts longer than 2 days. ? · You are not getting better as expected. Where can you learn more? Go to http://franco-hattie.info/. Enter 25 [id number] in the search box to learn more about \"Nausea and Vomiting: Care Instructions. \"  Current as of: March 20, 2017  Content Version: 11.4  © 1804-9259 [employer]. Care instructions adapted under license by [employer] (which disclaims liability or warranty for this information). If you have questions about a medical condition or this instruction, always ask your healthcare professional. [address] any warranty or liability for your use of this information.

## 2018-05-07 LAB
BACTERIA SPEC CULT: NORMAL
CC UR VC: NORMAL
SERVICE CMNT-IMP: NORMAL

## 2018-08-06 ENCOUNTER — HOSPITAL ENCOUNTER (OUTPATIENT)
Dept: PREADMISSION TESTING | Age: 33
Discharge: HOME | End: 2018-08-06
Payer: MEDICAID

## 2018-08-06 VITALS
WEIGHT: 184.5 LBS | DIASTOLIC BLOOD PRESSURE: 90 MMHG | OXYGEN SATURATION: 100 % | SYSTOLIC BLOOD PRESSURE: 139 MMHG | BODY MASS INDEX: 29.65 KG/M2 | TEMPERATURE: 98.7 F | HEART RATE: 77 BPM | HEIGHT: 66 IN

## 2018-08-06 LAB
ABO + RH BLD: NORMAL
ANION GAP SERPL CALC-SCNC: 10 MMOL/L (ref 5–15)
APPEARANCE UR: CLEAR
BACTERIA URNS QL MICRO: NEGATIVE /HPF
BILIRUB UR QL: NEGATIVE
BLOOD GROUP ANTIBODIES SERPL: NORMAL
BUN SERPL-MCNC: 15 MG/DL (ref 6–20)
BUN/CREAT SERPL: 21 (ref 12–20)
CALCIUM SERPL-MCNC: 8.3 MG/DL (ref 8.5–10.1)
CHLORIDE SERPL-SCNC: 110 MMOL/L (ref 97–108)
CO2 SERPL-SCNC: 20 MMOL/L (ref 21–32)
COLOR UR: ABNORMAL
CREAT SERPL-MCNC: 0.71 MG/DL (ref 0.55–1.02)
EPITH CASTS URNS QL MICRO: ABNORMAL /LPF
ERYTHROCYTE [DISTWIDTH] IN BLOOD BY AUTOMATED COUNT: 12.9 % (ref 11.5–14.5)
EST. AVERAGE GLUCOSE BLD GHB EST-MCNC: 108 MG/DL
GLUCOSE SERPL-MCNC: 86 MG/DL (ref 65–100)
GLUCOSE UR STRIP.AUTO-MCNC: NEGATIVE MG/DL
HBA1C MFR BLD: 5.4 % (ref 4.2–6.3)
HCG SERPL QL: NEGATIVE
HCT VFR BLD AUTO: 42.2 % (ref 35–47)
HGB BLD-MCNC: 13.8 G/DL (ref 11.5–16)
HGB UR QL STRIP: NEGATIVE
HYALINE CASTS URNS QL MICRO: ABNORMAL /LPF (ref 0–5)
INR PPP: 1 (ref 0.9–1.1)
KETONES UR QL STRIP.AUTO: NEGATIVE MG/DL
LEUKOCYTE ESTERASE UR QL STRIP.AUTO: NEGATIVE
MCH RBC QN AUTO: 32 PG (ref 26–34)
MCHC RBC AUTO-ENTMCNC: 32.7 G/DL (ref 30–36.5)
MCV RBC AUTO: 97.9 FL (ref 80–99)
NITRITE UR QL STRIP.AUTO: NEGATIVE
NRBC # BLD: 0 K/UL (ref 0–0.01)
NRBC BLD-RTO: 0 PER 100 WBC
PH UR STRIP: 8 [PH] (ref 5–8)
PLATELET # BLD AUTO: 282 K/UL (ref 150–400)
PMV BLD AUTO: 10.2 FL (ref 8.9–12.9)
POTASSIUM SERPL-SCNC: 4.3 MMOL/L (ref 3.5–5.1)
PROT UR STRIP-MCNC: ABNORMAL MG/DL
PROTHROMBIN TIME: 10 SEC (ref 9–11.1)
RBC # BLD AUTO: 4.31 M/UL (ref 3.8–5.2)
RBC #/AREA URNS HPF: ABNORMAL /HPF (ref 0–5)
SODIUM SERPL-SCNC: 140 MMOL/L (ref 136–145)
SP GR UR REFRACTOMETRY: 1.02 (ref 1–1.03)
SPECIMEN EXP DATE BLD: NORMAL
UA: UC IF INDICATED,UAUC: ABNORMAL
UROBILINOGEN UR QL STRIP.AUTO: 0.2 EU/DL (ref 0.2–1)
WBC # BLD AUTO: 8.2 K/UL (ref 3.6–11)
WBC URNS QL MICRO: ABNORMAL /HPF (ref 0–4)

## 2018-08-06 PROCEDURE — 36415 COLL VENOUS BLD VENIPUNCTURE: CPT | Performed by: ORTHOPAEDIC SURGERY

## 2018-08-06 PROCEDURE — 81001 URINALYSIS AUTO W/SCOPE: CPT | Performed by: ORTHOPAEDIC SURGERY

## 2018-08-06 PROCEDURE — 80048 BASIC METABOLIC PNL TOTAL CA: CPT | Performed by: ORTHOPAEDIC SURGERY

## 2018-08-06 PROCEDURE — 83036 HEMOGLOBIN GLYCOSYLATED A1C: CPT | Performed by: ORTHOPAEDIC SURGERY

## 2018-08-06 PROCEDURE — 85027 COMPLETE CBC AUTOMATED: CPT | Performed by: ORTHOPAEDIC SURGERY

## 2018-08-06 PROCEDURE — 85610 PROTHROMBIN TIME: CPT | Performed by: ORTHOPAEDIC SURGERY

## 2018-08-06 PROCEDURE — 84703 CHORIONIC GONADOTROPIN ASSAY: CPT | Performed by: ORTHOPAEDIC SURGERY

## 2018-08-06 PROCEDURE — 86900 BLOOD TYPING SEROLOGIC ABO: CPT | Performed by: ORTHOPAEDIC SURGERY

## 2018-08-06 RX ORDER — TOPIRAMATE 100 MG/1
100 TABLET, FILM COATED ORAL 2 TIMES DAILY
COMMUNITY

## 2018-08-06 NOTE — PERIOP NOTES
Preoperative instructions reviewed with patient. Patient given six pack of CHG wipes. Instructions reviewed on use of CHG wipes. Patient given SSI infection sheet and hand washing tips sheets. MRSA/MSSA treatment instruction sheet given with an explanation to patient that they  will be notified if treatment instructions need to be initiated. Patient was given the opportunity to ask questions on the information provided.

## 2018-08-07 LAB
BACTERIA SPEC CULT: NORMAL
BACTERIA SPEC CULT: NORMAL
SERVICE CMNT-IMP: NORMAL

## 2018-08-07 NOTE — H&P
[facility]  Location: - [facility] OFFICE  Patient #: [id number]  : 1985  Single / Language: [place] / Race: White  Female      History of Present Illness  The patient is a 34year old female who presents with neck pain. Symptoms include neck pain, neck stiffness, impaired range of motion and shoulder pain. Symptoms are located in the entire neck and left anterior neck. The pain radiates to the left shoulder, left arm and left hand. The patient describes the pain as sharp and aching. Onset was gradual. The symptoms occur constantly. The patient describes symptoms as severe and worsening. Symptoms are relieved by heat. Current treatment includes opioid analgesics. The patient was previously evaluated by a colleague (Jerson Lin). Past evaluation has included cervical spine x-rays, cervical spine MRI and physical therapy evaluation. Past treatment has included nonsteroidal anti-inflammatory drugs, opioid analgesics, heat and physical therapy. Family History   Cancer   Father, Mother. Hypertension   Father, Mother. Respiratory Condition   Father. Thyroid problems   Mother. Social History   Alcohol use   3-5 drinks per occasion, Drinks wine, per month. Caffeine use   Coffee, Tea. Current work status   Unemployed, not looking for work. Exercise   cycling, Occasionally. Marital status   Single. No drug use    Seat Belt Use   Always uses seat belts. Sun Exposure   Occasionally. Tobacco / smoke exposure   None. Tobacco use   Current some day smoker, Smokes < .5 pack of cigarettes per day. Medication History   Loperamide HCl  (2MG Capsule, Oral) Active. Nystatin  (087615JANV/GM Cream, External) Active. Ondansetron  (4MG Tablet Disint, Oral) Active. Topiramate  (50MG Tablet, Oral) Active.   Medications Reconciled     Pregnancy / Birth History  Pregnant   No.    Past Surgical History   Delivery   1 time    Diagnostic Studies History  Cervical Spine X-ray Results: Normal lordosis; minimal degenerative changes  MRI, Cervical Spine Results: Moderate spondylosis at C6-7 with a left-sided disc protrusion at C6-7 causing severe foraminal stenosis. Other Problems   Unspecified Diagnosis      Vitals   Weight: 190 lb   Height: 66 in   Body Surface Area: 1.96 m²   Body Mass Index: 30.67 kg/m²        Physical Exam   Neurologic  Overall Assessment of Muscle Strength and Tone reveals  Upper Extremities - Right Deltoid - 5/5. Left Deltoid - 5/5. Right Bicep - 5/5. Left Bicep - 5/5. Right Tricep - 5/5. Left Tricep - 4-/5. Right Wrist Extensors - 5/5. Left Wrist Extensors - 5/5. Right Wrist Flexors - 5/5. Left Wrist Flexors - 5/5. Right Intrinsics - 5/5. Left Intrinsics - 5/5. General Assessment of Reflexes  Right Hand - Lopez's sign is negative in the right hand. Left Hand - Lopez's sign is negative in the left hand. Reflexes (Dermatomes)  1/2 Decreased - Left Tricep (C7-8). 2/2 Normal - Left Bicep (C5-6), Left Brachioradialis (C5-6), Right Bicep (C5-6), Right Tricep (C7-8) and Right Brachioradialis (C5-6). Musculoskeletal  Global Assessment  Examination of related systems reveals - well-developed, well-nourished, in no acute distress, alert and oriented x 3 and normal coordination. Gait and Station - normal gait and station and normal posture. Spine/Ribs/Pelvis  Cervical Spine - Evaluation of related systems reveals - no lymphadenopathy and neurovascularly intact bilaterally. Inspection and Palpation - Tenderness - moderate and localized. Assessment of pain reveals the following findings: - Location - cervical area, upper trapezius area, (L) and mid scapular area, (L). ROJM - Flexion - 85 °. Right Lateral Flexion - 35 °. Left Lateral Flexion - 35 °. Extension - 70 °. Right Rotation - 80 °. Left Rotation - . Cervical Spine - Functional Testing - Foraminal Compression/Spurling's Test negative, Shoulder Depression Test negative, Upper Limb Tension Test negative.  Lumbosacral Spine - Examination of the lumbosacral spine reveals - no tenderness to palpation, no pain, normal strength and tone, no laxity or crepitus and normal lumbosacral spine movements. Assessment & Plan   Herniated nucleus pulposus, C6-7 (722.0  M50.223)  Current Plans  Started Percocet 5-325MG, 1 (one) Tablet four times daily, as needed, #50, 06/28/2018, No Refill. X-RAY EXAM OF LUMBAR SPINE, 4 OR MORE VIEWS (12993)  Cervicalgia (723.1  M54.2)  Cervical radiculitis (723.4  M54.12)  Treatment options were discussed with the patient in full.(V65.49)  Current Plans  Presurgical planning was preformed with the patient today  Surgery to be scheduled  Procedure: C6-7 Disc arthroplasty versus fusion    Addendum Note   She has a four-month history of left arm pain from a left-sided disc protrusion at C6-7.  She has failed conservative treatment.  She has a sensory motor deficit.  I think she is a candidate for a decompression at C6-7.  Given her young age I also think she candidate for a disc arthroplasty. The risks and benefits were discussed at length with the patient and the patient has elected to proceed.  Indications for surgery include failed conservative treatment.  Alternative treatments, risks and the perioperative course were discussed with the patient.  All questions were answered.  The risks and benefits of the procedure were explained.  Benefits include definitive diagnosis, relief of pain, elimination of deformity and improved function.  Risks of surgery including bleeding, infection, weakness, numbness, CSF leak, failure to improve symptoms, exacerbation of medical co-morbidities and even death were discussed with the patient. Signed by Kashif Magdaleno MD   Date of Surgery Update:  April C Olinda was seen and examined. History and physical has been reviewed. The patient has been examined.  There have been no significant clinical changes since the completion of the originally dated History and Physical.    Signed By: Brittney Park MD     August 17, 2018 7:27 AM

## 2018-08-14 ENCOUNTER — ANESTHESIA EVENT (OUTPATIENT)
Dept: SURGERY | Age: 33
End: 2018-08-14
Payer: MEDICAID

## 2018-08-17 ENCOUNTER — APPOINTMENT (OUTPATIENT)
Dept: GENERAL RADIOLOGY | Age: 33
End: 2018-08-17
Attending: ORTHOPAEDIC SURGERY
Payer: MEDICAID

## 2018-08-17 ENCOUNTER — HOSPITAL ENCOUNTER (OUTPATIENT)
Age: 33
Setting detail: OBSERVATION
Discharge: HOME | End: 2018-08-18
Attending: ORTHOPAEDIC SURGERY | Admitting: ORTHOPAEDIC SURGERY
Payer: MEDICAID

## 2018-08-17 ENCOUNTER — ANESTHESIA (OUTPATIENT)
Dept: SURGERY | Age: 33
End: 2018-08-17
Payer: MEDICAID

## 2018-08-17 DIAGNOSIS — M48.02 CERVICAL STENOSIS OF SPINE: Primary | ICD-10-CM

## 2018-08-17 LAB — HCG UR QL: NEGATIVE

## 2018-08-17 PROCEDURE — 77030038020 HC MANFLD NEPTUNE STRY -B: Performed by: ORTHOPAEDIC SURGERY

## 2018-08-17 PROCEDURE — C1713 ANCHOR/SCREW BN/BN,TIS/BN: HCPCS | Performed by: ORTHOPAEDIC SURGERY

## 2018-08-17 PROCEDURE — 76060000034 HC ANESTHESIA 1.5 TO 2 HR: Performed by: ORTHOPAEDIC SURGERY

## 2018-08-17 PROCEDURE — 77030013567 HC DRN WND RESERV BARD -A: Performed by: ORTHOPAEDIC SURGERY

## 2018-08-17 PROCEDURE — 74011250636 HC RX REV CODE- 250/636

## 2018-08-17 PROCEDURE — 72040 X-RAY EXAM NECK SPINE 2-3 VW: CPT

## 2018-08-17 PROCEDURE — 77030003666 HC NDL SPINAL BD -A: Performed by: ORTHOPAEDIC SURGERY

## 2018-08-17 PROCEDURE — 77030008684 HC TU ET CUF COVD -B: Performed by: ANESTHESIOLOGY

## 2018-08-17 PROCEDURE — 77030029099 HC BN WAX SSPC -A: Performed by: ORTHOPAEDIC SURGERY

## 2018-08-17 PROCEDURE — 77030002933 HC SUT MCRYL J&J -A: Performed by: ORTHOPAEDIC SURGERY

## 2018-08-17 PROCEDURE — 77030003833: Performed by: ORTHOPAEDIC SURGERY

## 2018-08-17 PROCEDURE — 74011250637 HC RX REV CODE- 250/637: Performed by: PHYSICIAN ASSISTANT

## 2018-08-17 PROCEDURE — 81025 URINE PREGNANCY TEST: CPT

## 2018-08-17 PROCEDURE — 76210000006 HC OR PH I REC 0.5 TO 1 HR: Performed by: ORTHOPAEDIC SURGERY

## 2018-08-17 PROCEDURE — 74011250636 HC RX REV CODE- 250/636: Performed by: PHYSICIAN ASSISTANT

## 2018-08-17 PROCEDURE — V2790 AMNIOTIC MEMBRANE: HCPCS | Performed by: ORTHOPAEDIC SURGERY

## 2018-08-17 PROCEDURE — 77030020782 HC GWN BAIR PAWS FLX 3M -B

## 2018-08-17 PROCEDURE — 99218 HC RM OBSERVATION: CPT

## 2018-08-17 PROCEDURE — 74011250636 HC RX REV CODE- 250/636: Performed by: ANESTHESIOLOGY

## 2018-08-17 PROCEDURE — 74011000272 HC RX REV CODE- 272: Performed by: ORTHOPAEDIC SURGERY

## 2018-08-17 PROCEDURE — 77030026438 HC STYL ET INTUB CARD -A: Performed by: ANESTHESIOLOGY

## 2018-08-17 PROCEDURE — 77030012406 HC DRN WND PENRS BARD -A: Performed by: ORTHOPAEDIC SURGERY

## 2018-08-17 PROCEDURE — 77030013079 HC BLNKT BAIR HGGR 3M -A: Performed by: ANESTHESIOLOGY

## 2018-08-17 PROCEDURE — 94760 N-INVAS EAR/PLS OXIMETRY 1: CPT

## 2018-08-17 PROCEDURE — 74011000250 HC RX REV CODE- 250: Performed by: ORTHOPAEDIC SURGERY

## 2018-08-17 PROCEDURE — 77030004391 HC BUR FLUT MEDT -C: Performed by: ORTHOPAEDIC SURGERY

## 2018-08-17 PROCEDURE — 76001 XR FLUOROSCOPY OVER 60 MINUTES: CPT

## 2018-08-17 PROCEDURE — 77030031139 HC SUT VCRL2 J&J -A: Performed by: ORTHOPAEDIC SURGERY

## 2018-08-17 PROCEDURE — 74011250637 HC RX REV CODE- 250/637

## 2018-08-17 PROCEDURE — 77030038600 HC TU BPLR IRR DISP STRY -B: Performed by: ORTHOPAEDIC SURGERY

## 2018-08-17 PROCEDURE — 76010000162 HC OR TIME 1.5 TO 2 HR INTENSV-TIER 1: Performed by: ORTHOPAEDIC SURGERY

## 2018-08-17 PROCEDURE — 74011250636 HC RX REV CODE- 250/636: Performed by: ORTHOPAEDIC SURGERY

## 2018-08-17 PROCEDURE — 74011000250 HC RX REV CODE- 250

## 2018-08-17 PROCEDURE — 77030011267 HC ELECTRD BLD COVD -A: Performed by: ORTHOPAEDIC SURGERY

## 2018-08-17 DEVICE — PRESTIGE LP DISC 6X16MM
Type: IMPLANTABLE DEVICE | Site: SPINE CERVICAL | Status: FUNCTIONAL
Brand: PRESTIGE® LP CERVICAL DISC SYSTEM

## 2018-08-17 DEVICE — Z DUP USE 2602123 GRAFT HUM TISS 3CMX 4CM AMNIO MEM VERSASHIELD: Type: IMPLANTABLE DEVICE | Site: SPINE CERVICAL | Status: FUNCTIONAL

## 2018-08-17 RX ORDER — HYDROMORPHONE HYDROCHLORIDE 1 MG/ML
INJECTION, SOLUTION INTRAMUSCULAR; INTRAVENOUS; SUBCUTANEOUS
Status: COMPLETED
Start: 2018-08-17 | End: 2018-08-17

## 2018-08-17 RX ORDER — OXYCODONE HYDROCHLORIDE 5 MG/1
5 TABLET ORAL AS NEEDED
Status: DISCONTINUED | OUTPATIENT
Start: 2018-08-17 | End: 2018-08-17 | Stop reason: HOSPADM

## 2018-08-17 RX ORDER — FENTANYL CITRATE 50 UG/ML
50 INJECTION, SOLUTION INTRAMUSCULAR; INTRAVENOUS AS NEEDED
Status: DISCONTINUED | OUTPATIENT
Start: 2018-08-17 | End: 2018-08-17 | Stop reason: HOSPADM

## 2018-08-17 RX ORDER — ONDANSETRON 2 MG/ML
4 INJECTION INTRAMUSCULAR; INTRAVENOUS
Status: ACTIVE | OUTPATIENT
Start: 2018-08-17 | End: 2018-08-18

## 2018-08-17 RX ORDER — DICLOFENAC SODIUM 75 MG/1
75 TABLET, DELAYED RELEASE ORAL 2 TIMES DAILY
Qty: 62 TAB | Refills: 0 | Status: SHIPPED | OUTPATIENT
Start: 2018-08-17 | End: 2018-09-17

## 2018-08-17 RX ORDER — MIDAZOLAM HYDROCHLORIDE 1 MG/ML
INJECTION, SOLUTION INTRAMUSCULAR; INTRAVENOUS AS NEEDED
Status: DISCONTINUED | OUTPATIENT
Start: 2018-08-17 | End: 2018-08-17 | Stop reason: HOSPADM

## 2018-08-17 RX ORDER — MIDAZOLAM HYDROCHLORIDE 1 MG/ML
0.5 INJECTION, SOLUTION INTRAMUSCULAR; INTRAVENOUS
Status: COMPLETED | OUTPATIENT
Start: 2018-08-17 | End: 2018-08-17

## 2018-08-17 RX ORDER — DIPHENHYDRAMINE HYDROCHLORIDE 50 MG/ML
12.5 INJECTION, SOLUTION INTRAMUSCULAR; INTRAVENOUS AS NEEDED
Status: DISCONTINUED | OUTPATIENT
Start: 2018-08-17 | End: 2018-08-17 | Stop reason: HOSPADM

## 2018-08-17 RX ORDER — OXYCODONE HYDROCHLORIDE 5 MG/1
5 TABLET ORAL
Status: DISCONTINUED | OUTPATIENT
Start: 2018-08-17 | End: 2018-08-18 | Stop reason: HOSPADM

## 2018-08-17 RX ORDER — HYDROMORPHONE HYDROCHLORIDE 1 MG/ML
0.6 INJECTION, SOLUTION INTRAMUSCULAR; INTRAVENOUS; SUBCUTANEOUS ONCE
Status: COMPLETED | OUTPATIENT
Start: 2018-08-17 | End: 2018-08-17

## 2018-08-17 RX ORDER — SCOLOPAMINE TRANSDERMAL SYSTEM 1 MG/1
PATCH, EXTENDED RELEASE TRANSDERMAL AS NEEDED
Status: DISCONTINUED | OUTPATIENT
Start: 2018-08-17 | End: 2018-08-17 | Stop reason: HOSPADM

## 2018-08-17 RX ORDER — ROPIVACAINE HYDROCHLORIDE 5 MG/ML
30 INJECTION, SOLUTION EPIDURAL; INFILTRATION; PERINEURAL AS NEEDED
Status: DISCONTINUED | OUTPATIENT
Start: 2018-08-17 | End: 2018-08-17 | Stop reason: HOSPADM

## 2018-08-17 RX ORDER — PROPOFOL 10 MG/ML
INJECTION, EMULSION INTRAVENOUS AS NEEDED
Status: DISCONTINUED | OUTPATIENT
Start: 2018-08-17 | End: 2018-08-17 | Stop reason: HOSPADM

## 2018-08-17 RX ORDER — SODIUM CHLORIDE 0.9 % (FLUSH) 0.9 %
5-10 SYRINGE (ML) INJECTION EVERY 8 HOURS
Status: DISCONTINUED | OUTPATIENT
Start: 2018-08-18 | End: 2018-08-18 | Stop reason: HOSPADM

## 2018-08-17 RX ORDER — NALOXONE HYDROCHLORIDE 0.4 MG/ML
0.4 INJECTION, SOLUTION INTRAMUSCULAR; INTRAVENOUS; SUBCUTANEOUS AS NEEDED
Status: DISCONTINUED | OUTPATIENT
Start: 2018-08-17 | End: 2018-08-18 | Stop reason: HOSPADM

## 2018-08-17 RX ORDER — NALOXONE HYDROCHLORIDE 4 MG/.1ML
SPRAY NASAL
Qty: 2 EACH | Refills: 1 | Status: SHIPPED | OUTPATIENT
Start: 2018-08-17

## 2018-08-17 RX ORDER — DIPHENHYDRAMINE HYDROCHLORIDE 50 MG/ML
12.5 INJECTION, SOLUTION INTRAMUSCULAR; INTRAVENOUS
Status: ACTIVE | OUTPATIENT
Start: 2018-08-17 | End: 2018-08-18

## 2018-08-17 RX ORDER — KETAMINE HYDROCHLORIDE 10 MG/ML
INJECTION, SOLUTION INTRAMUSCULAR; INTRAVENOUS AS NEEDED
Status: DISCONTINUED | OUTPATIENT
Start: 2018-08-17 | End: 2018-08-17 | Stop reason: HOSPADM

## 2018-08-17 RX ORDER — AMOXICILLIN 250 MG
1 CAPSULE ORAL 2 TIMES DAILY
Status: DISCONTINUED | OUTPATIENT
Start: 2018-08-17 | End: 2018-08-18 | Stop reason: HOSPADM

## 2018-08-17 RX ORDER — HYDROMORPHONE HYDROCHLORIDE 1 MG/ML
0.4 INJECTION, SOLUTION INTRAMUSCULAR; INTRAVENOUS; SUBCUTANEOUS ONCE
Status: COMPLETED | OUTPATIENT
Start: 2018-08-17 | End: 2018-08-17

## 2018-08-17 RX ORDER — OXYCODONE HYDROCHLORIDE 5 MG/1
10 TABLET ORAL
Status: DISCONTINUED | OUTPATIENT
Start: 2018-08-17 | End: 2018-08-18 | Stop reason: HOSPADM

## 2018-08-17 RX ORDER — SODIUM CHLORIDE, SODIUM LACTATE, POTASSIUM CHLORIDE, CALCIUM CHLORIDE 600; 310; 30; 20 MG/100ML; MG/100ML; MG/100ML; MG/100ML
INJECTION, SOLUTION INTRAVENOUS
Status: DISCONTINUED | OUTPATIENT
Start: 2018-08-17 | End: 2018-08-17 | Stop reason: HOSPADM

## 2018-08-17 RX ORDER — SUCCINYLCHOLINE CHLORIDE 20 MG/ML
INJECTION INTRAMUSCULAR; INTRAVENOUS AS NEEDED
Status: DISCONTINUED | OUTPATIENT
Start: 2018-08-17 | End: 2018-08-17 | Stop reason: HOSPADM

## 2018-08-17 RX ORDER — OXYCODONE HYDROCHLORIDE 5 MG/1
5-10 TABLET ORAL
Qty: 80 TAB | Refills: 0 | Status: SHIPPED | OUTPATIENT
Start: 2018-08-17

## 2018-08-17 RX ORDER — KETOROLAC TROMETHAMINE 30 MG/ML
30 INJECTION, SOLUTION INTRAMUSCULAR; INTRAVENOUS EVERY 6 HOURS
Status: COMPLETED | OUTPATIENT
Start: 2018-08-17 | End: 2018-08-18

## 2018-08-17 RX ORDER — HYDROMORPHONE HYDROCHLORIDE 1 MG/ML
0.5 INJECTION, SOLUTION INTRAMUSCULAR; INTRAVENOUS; SUBCUTANEOUS ONCE
Status: COMPLETED | OUTPATIENT
Start: 2018-08-17 | End: 2018-08-17

## 2018-08-17 RX ORDER — TOPIRAMATE 100 MG/1
100 TABLET, FILM COATED ORAL 2 TIMES DAILY
Status: DISCONTINUED | OUTPATIENT
Start: 2018-08-17 | End: 2018-08-18 | Stop reason: HOSPADM

## 2018-08-17 RX ORDER — FENTANYL CITRATE 50 UG/ML
INJECTION, SOLUTION INTRAMUSCULAR; INTRAVENOUS AS NEEDED
Status: DISCONTINUED | OUTPATIENT
Start: 2018-08-17 | End: 2018-08-17 | Stop reason: HOSPADM

## 2018-08-17 RX ORDER — SODIUM CHLORIDE 0.9 % (FLUSH) 0.9 %
5-10 SYRINGE (ML) INJECTION AS NEEDED
Status: DISCONTINUED | OUTPATIENT
Start: 2018-08-17 | End: 2018-08-17 | Stop reason: HOSPADM

## 2018-08-17 RX ORDER — CYCLOBENZAPRINE HCL 10 MG
10 TABLET ORAL
Status: DISCONTINUED | OUTPATIENT
Start: 2018-08-17 | End: 2018-08-18 | Stop reason: HOSPADM

## 2018-08-17 RX ORDER — CEFAZOLIN SODIUM/WATER 2 G/20 ML
2 SYRINGE (ML) INTRAVENOUS ONCE
Status: COMPLETED | OUTPATIENT
Start: 2018-08-17 | End: 2018-08-17

## 2018-08-17 RX ORDER — FENTANYL CITRATE 50 UG/ML
25 INJECTION, SOLUTION INTRAMUSCULAR; INTRAVENOUS
Status: DISCONTINUED | OUTPATIENT
Start: 2018-08-17 | End: 2018-08-18 | Stop reason: HOSPADM

## 2018-08-17 RX ORDER — MIDAZOLAM HYDROCHLORIDE 1 MG/ML
1 INJECTION, SOLUTION INTRAMUSCULAR; INTRAVENOUS AS NEEDED
Status: DISCONTINUED | OUTPATIENT
Start: 2018-08-17 | End: 2018-08-17 | Stop reason: HOSPADM

## 2018-08-17 RX ORDER — LIDOCAINE HYDROCHLORIDE 20 MG/ML
INJECTION, SOLUTION EPIDURAL; INFILTRATION; INTRACAUDAL; PERINEURAL AS NEEDED
Status: DISCONTINUED | OUTPATIENT
Start: 2018-08-17 | End: 2018-08-17 | Stop reason: HOSPADM

## 2018-08-17 RX ORDER — PROPOFOL 10 MG/ML
INJECTION, EMULSION INTRAVENOUS
Status: DISCONTINUED | OUTPATIENT
Start: 2018-08-17 | End: 2018-08-17 | Stop reason: HOSPADM

## 2018-08-17 RX ORDER — POLYETHYLENE GLYCOL 3350 17 G/17G
17 POWDER, FOR SOLUTION ORAL DAILY
Status: DISCONTINUED | OUTPATIENT
Start: 2018-08-18 | End: 2018-08-18 | Stop reason: HOSPADM

## 2018-08-17 RX ORDER — LIDOCAINE HYDROCHLORIDE 10 MG/ML
0.1 INJECTION, SOLUTION EPIDURAL; INFILTRATION; INTRACAUDAL; PERINEURAL AS NEEDED
Status: DISCONTINUED | OUTPATIENT
Start: 2018-08-17 | End: 2018-08-17 | Stop reason: HOSPADM

## 2018-08-17 RX ORDER — DEXAMETHASONE SODIUM PHOSPHATE 10 MG/ML
INJECTION INTRAMUSCULAR; INTRAVENOUS AS NEEDED
Status: DISCONTINUED | OUTPATIENT
Start: 2018-08-17 | End: 2018-08-17 | Stop reason: HOSPADM

## 2018-08-17 RX ORDER — ONDANSETRON 2 MG/ML
4 INJECTION INTRAMUSCULAR; INTRAVENOUS AS NEEDED
Status: DISCONTINUED | OUTPATIENT
Start: 2018-08-17 | End: 2018-08-17 | Stop reason: HOSPADM

## 2018-08-17 RX ORDER — SODIUM CHLORIDE 9 MG/ML
125 INJECTION, SOLUTION INTRAVENOUS CONTINUOUS
Status: DISPENSED | OUTPATIENT
Start: 2018-08-17 | End: 2018-08-18

## 2018-08-17 RX ORDER — VANCOMYCIN HYDROCHLORIDE 1 G/20ML
INJECTION, POWDER, LYOPHILIZED, FOR SOLUTION INTRAVENOUS AS NEEDED
Status: DISCONTINUED | OUTPATIENT
Start: 2018-08-17 | End: 2018-08-17 | Stop reason: HOSPADM

## 2018-08-17 RX ORDER — ACETAMINOPHEN 325 MG/1
650 TABLET ORAL EVERY 6 HOURS
Status: DISCONTINUED | OUTPATIENT
Start: 2018-08-17 | End: 2018-08-17 | Stop reason: ALTCHOICE

## 2018-08-17 RX ORDER — SODIUM CHLORIDE 0.9 % (FLUSH) 0.9 %
5-10 SYRINGE (ML) INJECTION EVERY 8 HOURS
Status: DISCONTINUED | OUTPATIENT
Start: 2018-08-17 | End: 2018-08-17 | Stop reason: HOSPADM

## 2018-08-17 RX ORDER — FENTANYL CITRATE 50 UG/ML
25 INJECTION, SOLUTION INTRAMUSCULAR; INTRAVENOUS
Status: COMPLETED | OUTPATIENT
Start: 2018-08-17 | End: 2018-08-17

## 2018-08-17 RX ORDER — ROCURONIUM BROMIDE 10 MG/ML
INJECTION, SOLUTION INTRAVENOUS AS NEEDED
Status: DISCONTINUED | OUTPATIENT
Start: 2018-08-17 | End: 2018-08-17 | Stop reason: HOSPADM

## 2018-08-17 RX ORDER — SODIUM CHLORIDE 0.9 % (FLUSH) 0.9 %
5-10 SYRINGE (ML) INJECTION AS NEEDED
Status: DISCONTINUED | OUTPATIENT
Start: 2018-08-17 | End: 2018-08-18 | Stop reason: HOSPADM

## 2018-08-17 RX ORDER — ACETAMINOPHEN 500 MG
1000 TABLET ORAL EVERY 6 HOURS
Status: DISCONTINUED | OUTPATIENT
Start: 2018-08-17 | End: 2018-08-18 | Stop reason: HOSPADM

## 2018-08-17 RX ORDER — FACIAL-BODY WIPES
10 EACH TOPICAL DAILY PRN
Status: DISCONTINUED | OUTPATIENT
Start: 2018-08-19 | End: 2018-08-18 | Stop reason: HOSPADM

## 2018-08-17 RX ORDER — CEFAZOLIN SODIUM/WATER 2 G/20 ML
2 SYRINGE (ML) INTRAVENOUS EVERY 8 HOURS
Status: COMPLETED | OUTPATIENT
Start: 2018-08-17 | End: 2018-08-18

## 2018-08-17 RX ORDER — ONDANSETRON 2 MG/ML
INJECTION INTRAMUSCULAR; INTRAVENOUS AS NEEDED
Status: DISCONTINUED | OUTPATIENT
Start: 2018-08-17 | End: 2018-08-17 | Stop reason: HOSPADM

## 2018-08-17 RX ORDER — SODIUM CHLORIDE, SODIUM LACTATE, POTASSIUM CHLORIDE, CALCIUM CHLORIDE 600; 310; 30; 20 MG/100ML; MG/100ML; MG/100ML; MG/100ML
25 INJECTION, SOLUTION INTRAVENOUS CONTINUOUS
Status: DISCONTINUED | OUTPATIENT
Start: 2018-08-17 | End: 2018-08-17 | Stop reason: HOSPADM

## 2018-08-17 RX ORDER — HYDROMORPHONE HYDROCHLORIDE 1 MG/ML
INJECTION, SOLUTION INTRAMUSCULAR; INTRAVENOUS; SUBCUTANEOUS AS NEEDED
Status: DISCONTINUED | OUTPATIENT
Start: 2018-08-17 | End: 2018-08-17 | Stop reason: HOSPADM

## 2018-08-17 RX ORDER — SODIUM CHLORIDE 9 MG/ML
25 INJECTION, SOLUTION INTRAVENOUS CONTINUOUS
Status: DISCONTINUED | OUTPATIENT
Start: 2018-08-17 | End: 2018-08-17 | Stop reason: HOSPADM

## 2018-08-17 RX ORDER — SODIUM CHLORIDE, SODIUM LACTATE, POTASSIUM CHLORIDE, CALCIUM CHLORIDE 600; 310; 30; 20 MG/100ML; MG/100ML; MG/100ML; MG/100ML
100 INJECTION, SOLUTION INTRAVENOUS CONTINUOUS
Status: DISCONTINUED | OUTPATIENT
Start: 2018-08-17 | End: 2018-08-17 | Stop reason: HOSPADM

## 2018-08-17 RX ADMIN — HYDROMORPHONE HYDROCHLORIDE 0.5 MG: 1 INJECTION, SOLUTION INTRAMUSCULAR; INTRAVENOUS; SUBCUTANEOUS at 08:00

## 2018-08-17 RX ADMIN — MIDAZOLAM 0.5 MG: 1 INJECTION INTRAMUSCULAR; INTRAVENOUS at 09:15

## 2018-08-17 RX ADMIN — STANDARDIZED SENNA CONCENTRATE AND DOCUSATE SODIUM 1 TABLET: 8.6; 5 TABLET, FILM COATED ORAL at 18:00

## 2018-08-17 RX ADMIN — HYDROMORPHONE HYDROCHLORIDE 0.4 MG: 1 INJECTION, SOLUTION INTRAMUSCULAR; INTRAVENOUS; SUBCUTANEOUS at 09:30

## 2018-08-17 RX ADMIN — HYDROMORPHONE HYDROCHLORIDE 0.6 MG: 1 INJECTION, SOLUTION INTRAMUSCULAR; INTRAVENOUS; SUBCUTANEOUS at 11:25

## 2018-08-17 RX ADMIN — PROPOFOL 200 MG: 10 INJECTION, EMULSION INTRAVENOUS at 07:40

## 2018-08-17 RX ADMIN — TOPIRAMATE 100 MG: 100 TABLET, FILM COATED ORAL at 18:00

## 2018-08-17 RX ADMIN — HYDROMORPHONE HYDROCHLORIDE 0.5 MG: 1 INJECTION, SOLUTION INTRAMUSCULAR; INTRAVENOUS; SUBCUTANEOUS at 12:10

## 2018-08-17 RX ADMIN — MIDAZOLAM HYDROCHLORIDE 2 MG: 1 INJECTION, SOLUTION INTRAMUSCULAR; INTRAVENOUS at 07:35

## 2018-08-17 RX ADMIN — HYDROMORPHONE HYDROCHLORIDE 0.5 MG: 1 INJECTION, SOLUTION INTRAMUSCULAR; INTRAVENOUS; SUBCUTANEOUS at 09:03

## 2018-08-17 RX ADMIN — ACETAMINOPHEN 1000 MG: 500 TABLET ORAL at 18:00

## 2018-08-17 RX ADMIN — LIDOCAINE HYDROCHLORIDE 100 MG: 20 INJECTION, SOLUTION EPIDURAL; INFILTRATION; INTRACAUDAL; PERINEURAL at 08:00

## 2018-08-17 RX ADMIN — FENTANYL CITRATE 25 MCG: 50 INJECTION, SOLUTION INTRAMUSCULAR; INTRAVENOUS at 09:35

## 2018-08-17 RX ADMIN — OXYCODONE HYDROCHLORIDE 10 MG: 5 TABLET ORAL at 18:00

## 2018-08-17 RX ADMIN — FENTANYL CITRATE 25 MCG: 50 INJECTION, SOLUTION INTRAMUSCULAR; INTRAVENOUS at 09:20

## 2018-08-17 RX ADMIN — FENTANYL CITRATE 100 MCG: 50 INJECTION, SOLUTION INTRAMUSCULAR; INTRAVENOUS at 07:40

## 2018-08-17 RX ADMIN — SODIUM CHLORIDE, SODIUM LACTATE, POTASSIUM CHLORIDE, CALCIUM CHLORIDE: 600; 310; 30; 20 INJECTION, SOLUTION INTRAVENOUS at 07:30

## 2018-08-17 RX ADMIN — SUCCINYLCHOLINE CHLORIDE 120 MG: 20 INJECTION INTRAMUSCULAR; INTRAVENOUS at 07:40

## 2018-08-17 RX ADMIN — ROCURONIUM BROMIDE 5 MG: 10 INJECTION, SOLUTION INTRAVENOUS at 07:40

## 2018-08-17 RX ADMIN — OXYCODONE HYDROCHLORIDE 10 MG: 5 TABLET ORAL at 21:14

## 2018-08-17 RX ADMIN — MIDAZOLAM 0.5 MG: 1 INJECTION INTRAMUSCULAR; INTRAVENOUS at 09:52

## 2018-08-17 RX ADMIN — KETOROLAC TROMETHAMINE 30 MG: 30 INJECTION, SOLUTION INTRAMUSCULAR; INTRAVENOUS at 18:00

## 2018-08-17 RX ADMIN — OXYCODONE HYDROCHLORIDE 10 MG: 5 TABLET ORAL at 14:56

## 2018-08-17 RX ADMIN — BENZOCAINE AND MENTHOL 1 LOZENGE: 15; 3.6 LOZENGE ORAL at 15:49

## 2018-08-17 RX ADMIN — Medication 2 G: at 16:00

## 2018-08-17 RX ADMIN — MIDAZOLAM 0.5 MG: 1 INJECTION INTRAMUSCULAR; INTRAVENOUS at 10:45

## 2018-08-17 RX ADMIN — MEPERIDINE HYDROCHLORIDE 25 MG: 50 INJECTION INTRAMUSCULAR; INTRAVENOUS; SUBCUTANEOUS at 09:50

## 2018-08-17 RX ADMIN — Medication 2 G: at 07:50

## 2018-08-17 RX ADMIN — KETAMINE HYDROCHLORIDE 50 MG: 10 INJECTION, SOLUTION INTRAMUSCULAR; INTRAVENOUS at 07:40

## 2018-08-17 RX ADMIN — HYDROMORPHONE HYDROCHLORIDE 0.5 MG: 1 INJECTION, SOLUTION INTRAMUSCULAR; INTRAVENOUS; SUBCUTANEOUS at 07:36

## 2018-08-17 RX ADMIN — SODIUM CHLORIDE 125 ML/HR: 900 INJECTION, SOLUTION INTRAVENOUS at 21:32

## 2018-08-17 RX ADMIN — SCOLOPAMINE TRANSDERMAL SYSTEM 1 PATCH: 1 PATCH, EXTENDED RELEASE TRANSDERMAL at 07:30

## 2018-08-17 RX ADMIN — FENTANYL CITRATE 25 MCG: 50 INJECTION, SOLUTION INTRAMUSCULAR; INTRAVENOUS at 09:30

## 2018-08-17 RX ADMIN — DEXAMETHASONE SODIUM PHOSPHATE 10 MG: 10 INJECTION INTRAMUSCULAR; INTRAVENOUS at 07:45

## 2018-08-17 RX ADMIN — PROPOFOL 150 MCG/KG/MIN: 10 INJECTION, EMULSION INTRAVENOUS at 07:40

## 2018-08-17 RX ADMIN — SODIUM CHLORIDE 125 ML/HR: 900 INJECTION, SOLUTION INTRAVENOUS at 09:15

## 2018-08-17 RX ADMIN — ONDANSETRON 4 MG: 2 INJECTION INTRAMUSCULAR; INTRAVENOUS at 08:51

## 2018-08-17 RX ADMIN — MIDAZOLAM 0.5 MG: 1 INJECTION INTRAMUSCULAR; INTRAVENOUS at 09:20

## 2018-08-17 RX ADMIN — FENTANYL CITRATE 25 MCG: 50 INJECTION, SOLUTION INTRAMUSCULAR; INTRAVENOUS at 09:25

## 2018-08-17 RX ADMIN — DIPHENHYDRAMINE HYDROCHLORIDE 12.5 MG: 50 INJECTION, SOLUTION INTRAMUSCULAR; INTRAVENOUS at 09:23

## 2018-08-17 RX ADMIN — HYDROMORPHONE HYDROCHLORIDE 0.5 MG: 1 INJECTION, SOLUTION INTRAMUSCULAR; INTRAVENOUS; SUBCUTANEOUS at 07:30

## 2018-08-17 RX ADMIN — MIDAZOLAM HYDROCHLORIDE 3 MG: 1 INJECTION, SOLUTION INTRAMUSCULAR; INTRAVENOUS at 07:30

## 2018-08-17 NOTE — OP NOTES
[facility]  OPERATIVE REPORT    Edgardo Diop  MR#: [id number]  : 1985  ACCOUNT #: [de-identified]   DATE OF SERVICE: 2018    PREOPERATIVE DIAGNOSIS:  Herniated nucleus pulposus C6-C7, left side cervical radiculopathy. POSTOPERATIVE DIAGNOSIS:   Herniated nucleus pulposus C6-C7, left side cervical radiculopathy. PROCEDURE PERFORMED:  Anterior cervical diskectomy at C6-C7 with cervical disk arthroplasty C6-7. SURGEON:  Andra Hurd MD    ASSISTANT:  Mahogany Walter PA-C    ESTIMATED BLOOD LOSS:  Minimal.    COMPLICATIONS:  None. ANESTHESIA:  General    SPECIMENS REMOVED:   None    IMPLANTS:  Medtronic Prestige LP    INDICATIONS:  A very pleasant 59-year-old female with neck pain, left arm pain, left-sided disk protrusion C6-C7, had failed conservative treatment. Patient understood the risks and benefits, elected to proceed. DESCRIPTION OF PROCEDURE:  The patient was identified and brought to the operative suite and underwent general anesthesia without difficulty. The neck was placed in the supine position. After prepping, draping, timeout was performed. An x-ray was then brought in after timeout. We located the C6-7 level for the skin incision. We then made a sharp incision and then dissect down to the platysma. This was split longitudinally. Blunt dissection of medial sternocleidomastoid down the cervical fascia. At which time we then dissected down to the C7 disk space and confirmed this on fluoroscopy. Radiolucent retractors were placed. We did annulotomy followed by complete diskectomy removing the remainder of the disk material as well as cartilaginous endplates. Posterior vertebral osteophytes were also resected. The posterior longitudinal ligament was elevated and resected, disk herniation subligamentous at C6 on the left was cleared with pituitary rongeurs, curved curettes.   We also undercut proximally with #1 and #2 Kerrison for further decompression. Afterwards, a blunt nerve hook passed in the foramen without any impingement. After satisfied decompression, we did the trial spacer of the Medtronic Prestige LP implant. A 6 x 60 mm graft provided good anterior, posterior well as medial lateral as well as with good coverage of the endplates. We then rasped and then we positioned the rail guide cutters followed by placement of the 6 x 16  Prestige LP implant. Patient tolerated this well. Final x-rays demonstrated stable fixation. The patient had a standard closure, returned to PACU in stable condition.       Virgina Bosworth, MD Bertell Shook / RUFINO  D: 08/17/2018 08:57     T: 08/17/2018 13:29  JOB #: [id number]

## 2018-08-17 NOTE — IP AVS SNAPSHOT
[address] [facility] 
[phone] Patient: Chantel Prakash MRN: [id number] CAT:6/45/0143 A check vamsi indicates which time of day the medication should be taken. My Medications START taking these medications Instructions Each Dose to Equal  
 Morning Noon Evening Bedtime  
 diclofenac EC 75 mg EC tablet Commonly known as:  VOLTAREN Your last dose was: Your next dose is: Take 1 Tab by mouth two (2) times a day for 31 days. 75 mg  
    
   
   
   
  
 naloxone 4 mg/actuation nasal spray Commonly known as:  [employer] Your last dose was: Your next dose is:    
   
   
 Use 1 spray intranasally, then discard. Repeat with new spray every 2 min as needed for opioid overdose symptoms, alternating nostrils. oxyCODONE IR 5 mg immediate release tablet Commonly known as:  Ranier Marlynin Your last dose was: Your next dose is: Take 1-2 Tabs by mouth every four (4) hours as needed for Pain. Max Daily Amount: 60 mg.  
 5-10 mg CONTINUE taking these medications Instructions Each Dose to Equal  
 Morning Noon Evening Bedtime TOPAMAX 100 mg tablet Generic drug:  topiramate Your last dose was: Your next dose is: Take 100 mg by mouth two (2) times a day. 100 mg  
    
   
   
   
  
  
STOP taking these medications   
 oxyCODONE-acetaminophen 5-325 mg per tablet Commonly known as:  PERCOCET Where to Get Your Medications Information on where to get these meds will be given to you by the nurse or doctor. ! Ask your nurse or doctor about these medications  
  diclofenac EC 75 mg EC tablet  
 naloxone 4 mg/actuation nasal spray  
 oxyCODONE IR 5 mg immediate release tablet

## 2018-08-17 NOTE — ANESTHESIA PREPROCEDURE EVALUATION
Anesthetic History   No history of anesthetic complications            Review of Systems / Medical History  Patient summary reviewed, nursing notes reviewed and pertinent labs reviewed    Pulmonary          Smoker         Neuro/Psych         Psychiatric history     Cardiovascular  Within defined limits                Exercise tolerance: >4 METS     GI/Hepatic/Renal  Within defined limits              Endo/Other  Within defined limits           Other Findings   Comments: Cervical stenosis           Physical Exam    Airway  Mallampati: II  TM Distance: 4 - 6 cm  Neck ROM: decreased range of motion   Mouth opening: Normal     Cardiovascular  Regular rate and rhythm,  S1 and S2 normal,  no murmur, click, rub, or gallop             Dental  No notable dental hx       Pulmonary  Breath sounds clear to auscultation               Abdominal  GI exam deferred       Other Findings            Anesthetic Plan    ASA: 2  Anesthesia type: general          Induction: Intravenous  Anesthetic plan and risks discussed with: Patient

## 2018-08-17 NOTE — BRIEF OP NOTE
BRIEF OPERATIVE NOTE    Date of Procedure: 8/17/2018   Preoperative Diagnosis: HNP  CERVICAL RADICULITIS   Postoperative Diagnosis: HNP  CERVICAL RADICULITIS     Procedure(s):  C6-7 CERVICAL DISC ARTHROPLASTY  (REQ FLIP)  Surgeon(s) and Role:     * Raji Moreno MD - Primary         Surgical Assistant: Htatie Iglesias PA-C    Surgical Staff:  Circ-1: Yvonne Goodwin  Physician Assistant: NEDRA Regalado  Radiology Technician: Memo Claytonron  Scrub Tech-1: Nica Carlin  Scrub Tech-2: MyStarAutograph Player  Event Time In   Incision Start 7640   Incision Close 0902     Anesthesia: General   Estimated Blood Loss: See full op note  Specimens: * No specimens in log *   Findings: Cervical stenosis     Complications: None  Implants:   Implant Name Type Inv.  Item Serial No.  Lot No. LRB No. Used Action   MEMBRANE AMNIOTIC WND 3X4CM -- VERSASHIELD - [id number]  MEMBRANE AMNIOTIC WND 3X4CM -- VERSASHIELD [id number] [id number] N/A 1 Implanted   DISC CERV LP CERM 6X16MM TI -- PRESTIGE - SN/A   DISC CERV LP CERM 6X16MM TI -- PRESTIGE N/A [employer] SOF[employer]ANEK [id number] N/A 1 Implanted

## 2018-08-17 NOTE — PROGRESS NOTES
TRANSFER - OUT REPORT:    Verbal report given to Carmela on April C Olinda  being transferred to [place] [address] for routine progression of care       Report consisted of patients Situation, Background, Assessment and   Recommendations(SBAR). Time Pre op antibiotic given:0750  Anesthesia Stop time: 7080  [place] Present on Transfer to floor:n  Order for [place] on Chart:n  Discharge Prescriptions with Chart:y    Information from the following report(s) SBAR, Kardex, OR Summary, Procedure Summary, Intake/Output, MAR, Recent Results and Med Rec Status was reviewed with the receiving nurse. Opportunity for questions and clarification was provided. Is the patient on 02? NO       L/Min        Other     Is the patient on a monitor? NO    Is the nurse transporting with the patient? NO    Surgical Waiting Area notified of patient's transfer from PACU?  YES      The following personal items collected during your admission accompanied patient upon transfer:   Dental Appliance: Dental Appliances: None  Vision:    Hearing Aid:    Jewelry:    Clothing:    Other Valuables:    Valuables sent to safe:

## 2018-08-17 NOTE — DISCHARGE INSTRUCTIONS
After Hospital Care Plan:  Discharge Instructions Cervical (Neck) Spine Surgery Dr. Amarjit Godinez    Patient Name: Chantel Prakash    Date of procedure: 8/17/2018  Date of discharge:     Procedure: Procedure(s):  C6-7 CERVICAL DISC ARTHROPLASTY  (REQ FLIP)  PCP: Jonathon Almodovar MD    Follow up appointments   -follow up with Dr. Amarjit Godinez in 2 weeks. Call [phone] to make an appointment as soon as you get home from the hospital.    When to call your Orthopaedic Surgeon:  -Difficulty swallowing that is worse than when you left the hospital.  -Signs of infection-if your incision is red; continues to have drainage; drainage has a foul odor or if you have a persistent fever over 101 degrees for 24 hours  -nausea or vomiting, severe headache  -changes in sensation in your arms or legs (numbness, tingling, loss of color)  -increased weakness-greater than before your surgery  -severe pain or pain not relieved by medications  -Signs of a blood clot in your leg-calf pain, tenderness, redness, swelling of lower leg    When to call your Primary Care Physician:  -Concerns about medical conditions such as diabetes, high blood pressure, asthma, congestive heart failure  -Call if blood sugars are elevated, persistent headache or dizziness, coughing or congestion, constipation or diarrhea, burning with urination, abnormal heart rate    When to call 911 and go to the nearest emergency room:  -acute onset of chest pain, shortness of breath, difficulty breathing    Activity  - You are going home a well person, be as active as possible. Your only exercise should be walking. Start with short frequent walks and increase your walking distance each day.  -Limit the amount of time you sit to 20-30 minute intervals. Sitting for prolonged periods of time will be uncomfortable for you following surgery.   - Do NOT lift anything over 5 pounds  -From now on, even when lifting light weight, bend with your knees and not your back.  -Do NOT do any neck exercises until you have been instructed by your doctor  -When you are in bed, you may lay on your back or on either side. Do NOT lie on your stomach    Cervical Collar (Aspen Collar)  -You are required to wear your cervical collar for 4 days; except when showering. Stop wearing soft collar day 5.  - We encourage range of motion of the neck to prevent unwanted fusion. Avoid hyperextension - avoid looking straight up towards the ceiling.  - Clean the pads on your neck collar every day by hand washing with a mild soap and water. Pat them dry with a towel and lay out to air dry. Do not use heat to dry the pads. Driving  -You may not drive or return to work until instructed by your physician. However, you may ride in the car for short periods of time. Incision Care  Your incision has been closed with absorbable sutures and the Zipline skin closure system. This will assist with healing. The Penelope Chuy is to remain on your incision for 2 weeks. A dry dressing (ABD and tape) will be placed over it and should be changed daily, for at least the first several days after your surgery. If you have no incisional drainage, you may leave the incision open to air if you wish, still leaving the Zipline in place. Please make sure to wash your hands prior to touching your dressing. You may take brief showers but do not run the water directly onto the wound. After your shower, blot your incision dry with a soft towel and replace the dry dressing. Do not allow the tape to come in contact with the Zipline. Do not rub or apply any lotions or ointments to your incision site. Do not soak or scrub your wound. The Zipline dressing will be removed during your two week follow-up appointment. If you experience drainage leaking from underneath the Zipline or if it peels off before 2 weeks, please contact your orthopedic surgeons office.      Showering  -You may shower in approximately 2 days after your surgery.    -Leave the dressing on during your shower. Do NOT allow the water to run directly onto your dressing. Once you get out of the shower, put on a dry dressing.  -Reminder- Make sure you put clean pads on your collar after your shower.    -Do not take a tub bath. Preventing blood clots  -You have been given T.E.D. stockings to wear. Continue to wear these for 7 days after your discharge. Put them on in the morning and take them off at night.    -They are used to increase your circulation and prevent blood clots from forming in your legs  -T. E.D. stockings can be machine washed, temperature not to exceed 160° F (71°C) and machine dried for 15 to 20 minutes, temperature not to exceed 250° F (121°C). Pain management  -Take pain medication as prescribed; decrease the amount you use as your pain lessens  -Do not wait until you are in extreme pain to take your medication.  -Avoid alcoholic beverages while taking pain medication    Additional medications  - Take diclofenac 75 mg twice a day for 1 full month to prevent unwanted fusion    Pain Medication Safety  DO:  -Read the Medication Guide   -Take your medicine exactly as prescribed   -Store your medicine away from children and in a safe place   -Flush unused medicine down the toilet   -Call your healthcare provider for medical advice about side effects. You may report side effects to FDA at 5-412-FDA-2096.   -Please be aware that many medications contain Tylenol. We do not want you to over medicate so please read the information below as a guide. Do not take more than 4 Grams of Tylenol in a 24 hour period.   (There are 1000 milligrams in one Gram)                                                                                                                                                                                                    Percocet contains 325 mg of Tylenol per tablet (do not take more than 12 tablets in 24 hours)  Lortab contains 500 mg of Tylenol per tablet (do not take more than 8 tablets in 24 hours)  Norco contains 325 mg of Tylenol per tablet (do not take more than 12 tablets in 24 hours). DO NOT:  -Do not give your medicine to others   -Do not take medicine unless it was prescribed for you   -Do not stop taking your medicine without talking to your healthcare provider   -Do not break, chew, crush, dissolve, or inject your medicine. If you cannot  swallow your medicine whole, talk to your healthcare provider.  -Do not drink alcohol while taking this medicine  -Do not take anti-inflammatory medications or aspirin unless instructed by your     Physician. Diet  -resume usual diet; drink plenty of fluids; eat foods high in fiber  -It is important to have regular bowel movements. Pain medications may cause constipation. You may want to take a stool softener (such as Senokot-S or Colace) to prevent constipation. If constipation occurs, take a laxative (such as Dulcolax tablets, Milk of Magnesia, or a suppository). Laxatives should only be used if the above preventable measures have failed and you still have not had a bowel movement after three days.

## 2018-08-18 VITALS
WEIGHT: 184 LBS | TEMPERATURE: 98 F | HEART RATE: 60 BPM | OXYGEN SATURATION: 99 % | RESPIRATION RATE: 14 BRPM | SYSTOLIC BLOOD PRESSURE: 106 MMHG | DIASTOLIC BLOOD PRESSURE: 66 MMHG | BODY MASS INDEX: 29.57 KG/M2 | HEIGHT: 66 IN

## 2018-08-18 PROCEDURE — 97110 THERAPEUTIC EXERCISES: CPT

## 2018-08-18 PROCEDURE — 97162 PT EVAL MOD COMPLEX 30 MIN: CPT

## 2018-08-18 PROCEDURE — 97116 GAIT TRAINING THERAPY: CPT

## 2018-08-18 PROCEDURE — 74011250637 HC RX REV CODE- 250/637: Performed by: PHYSICIAN ASSISTANT

## 2018-08-18 PROCEDURE — 97165 OT EVAL LOW COMPLEX 30 MIN: CPT

## 2018-08-18 PROCEDURE — 74011250636 HC RX REV CODE- 250/636: Performed by: PHYSICIAN ASSISTANT

## 2018-08-18 PROCEDURE — 99218 HC RM OBSERVATION: CPT

## 2018-08-18 PROCEDURE — 74011000250 HC RX REV CODE- 250: Performed by: PHYSICIAN ASSISTANT

## 2018-08-18 RX ADMIN — STANDARDIZED SENNA CONCENTRATE AND DOCUSATE SODIUM 1 TABLET: 8.6; 5 TABLET, FILM COATED ORAL at 09:34

## 2018-08-18 RX ADMIN — ACETAMINOPHEN 1000 MG: 500 TABLET ORAL at 00:03

## 2018-08-18 RX ADMIN — KETOROLAC TROMETHAMINE 30 MG: 30 INJECTION, SOLUTION INTRAMUSCULAR; INTRAVENOUS at 06:05

## 2018-08-18 RX ADMIN — Medication 2 G: at 00:03

## 2018-08-18 RX ADMIN — TOPIRAMATE 100 MG: 100 TABLET, FILM COATED ORAL at 09:34

## 2018-08-18 RX ADMIN — ACETAMINOPHEN 1000 MG: 500 TABLET ORAL at 06:05

## 2018-08-18 RX ADMIN — OXYCODONE HYDROCHLORIDE 10 MG: 5 TABLET ORAL at 12:17

## 2018-08-18 RX ADMIN — OXYCODONE HYDROCHLORIDE 10 MG: 5 TABLET ORAL at 09:34

## 2018-08-18 RX ADMIN — OXYCODONE HYDROCHLORIDE 10 MG: 5 TABLET ORAL at 03:16

## 2018-08-18 RX ADMIN — KETOROLAC TROMETHAMINE 30 MG: 30 INJECTION, SOLUTION INTRAMUSCULAR; INTRAVENOUS at 00:03

## 2018-08-18 RX ADMIN — POLYETHYLENE GLYCOL 3350 17 G: 17 POWDER, FOR SOLUTION ORAL at 09:33

## 2018-08-18 RX ADMIN — OXYCODONE HYDROCHLORIDE 10 MG: 5 TABLET ORAL at 06:05

## 2018-08-18 RX ADMIN — ACETAMINOPHEN 1000 MG: 500 TABLET ORAL at 12:17

## 2018-08-18 RX ADMIN — KETOROLAC TROMETHAMINE 30 MG: 30 INJECTION, SOLUTION INTRAMUSCULAR; INTRAVENOUS at 12:17

## 2018-08-18 RX ADMIN — OXYCODONE HYDROCHLORIDE 10 MG: 5 TABLET ORAL at 00:03
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
## 2018-08-18 NOTE — PROGRESS NOTES
Orthopedic Spine Progress Note  Post Op day: 1 Day Post-Op    2018 9:42 AM   Admit Date: 2018  Procedure: Procedure(s):  C6-7 CERVICAL DISC ARTHROPLASTY  (REQ FLIP)    Subjective:     Chantel Prakash is doing well post operatively. She does have some neck and upper back pain, mild dysphagia, mild pain with deep inspirations. She has tolerated clears and applesauce. She denies radicular upper extremity pain. No N/V/SOB. .   Tolerating diet. No N/V. Pain Control:   Pain Assessment  Pain Scale 1: Numeric (0 - 10)  Pain Intensity 1: 9  Pain Onset 1: postop  Pain Location 1: Neck  Pain Orientation 1: Inner  Pain Description 1: Aching  Pain Intervention(s) 1: Medication (see MAR)    Objective:          Physical Exam:  General:  Alert and oriented. No acute distress. Heart:  Respirations unlabored. Abdomen:   Extremities: Soft, non-tender. No evidence of cyanosis. Pulses palpable in both upper and lower extremities. Neurologic:  Musculoskeletal:  No new motor deficits. Neurovascular exam within normal limits. Soft Collar in place  Sensation stable. Motor: unchanged C5-T1 and L2-S1. Eliceo's sign negative in bilateral lower extremities. Calves soft, nontender upon palpation and with passive twitch. Moves both upper and lower extremities. Incision: clean, dry, and intact. No significant erythema or swelling. No active drainage noted. Vital Signs:    Blood pressure 106/66, pulse 60, temperature 98 °F (36.7 °C), resp. rate 14, height 5' 5.5\" (1.664 m), weight 83.5 kg (184 lb), SpO2 99 %, not currently breastfeeding. Temp (24hrs), Av.2 °F (36.8 °C), Min:98 °F (36.7 °C), Max:98.7 °F (37.1 °C)      LAB:    No results for input(s): HCT, HGB, PLT, HCTEXT, HGBEXT, PLTEXT in the last 72 hours.   Lab Results   Component Value Date/Time    Sodium 140 2018 11:56 AM    Potassium 4.3 2018 11:56 AM    Chloride 110 (H) 2018 11:56 AM    CO2 20 (L) 2018 11:56 AM Glucose 86 08/06/2018 11:56 AM    BUN 15 08/06/2018 11:56 AM    Creatinine 0.71 08/06/2018 11:56 AM    Calcium 8.3 (L) 08/06/2018 11:56 AM       Intake/Output:   08/16 1901 - 08/18 0700  In: 1000 [I.V.:1000]  Out: 225 [Urine:200]    PT/OT:   Gait:                    Assessment:   Patient is 1 Day Post-Op s/p Procedure(s):  C6-7 CERVICAL DISC ARTHROPLASTY  (REQ FLIP)    Plan:     1. Continue PT/OT  2. Continue established methods of pain control  3. VTE Prophylaxes - TEDS &/or SCDs   4. Advance diet as tolerated  5.  Plan for D/C today      Signed By: Nela Hill MD

## 2018-08-18 NOTE — ANESTHESIA POSTPROCEDURE EVALUATION
Post-Anesthesia Evaluation and Assessment    Patient: Chantel Prakash MRN: [id number]  SSN: [id number]    YOB: 1985  Age: 34 y.o. Sex: female       Cardiovascular Function/Vital Signs  Visit Vitals    /66 (BP 1 Location: Left arm, BP Patient Position: At rest)    Pulse 60    Temp 36.7 °C (98 °F)    Resp 14    Ht 5' 5.5\" (1.664 m)    Wt 83.5 kg (184 lb)    SpO2 99%    BMI 30.15 kg/m2       Patient is status post total IV anesthesia anesthesia for Procedure(s):  C6-7 CERVICAL DISC ARTHROPLASTY  (REQ FLIP). Nausea/Vomiting: None    Postoperative hydration reviewed and adequate. Pain:  Pain Scale 1: Numeric (0 - 10) (08/18/18 0934)  Pain Intensity 1: 9 (08/18/18 0934)   Managed    Neurological Status:   Neuro (WDL): Within Defined Limits (08/17/18 0945)   At baseline    Mental Status and Level of Consciousness: Arousable    Pulmonary Status:   O2 Device: Room air (08/18/18 0756)   Adequate oxygenation and airway patent    Complications related to anesthesia: None    Post-anesthesia assessment completed.  No concerns    Signed By: Kelly Malave MD     August 18, 2018
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
## 2018-08-18 NOTE — PROGRESS NOTES
Chart reviewed for transitions of care, consult received for home health. Patient has been evaluated by therapy and cleared to return home with no needs.   [employer] , [place]

## 2019-04-24 NOTE — ROUTINE PROCESS
TRANSFER - IN REPORT:    Verbal report received from Shantel(name) on April C Artiejace  being received from PACU(unit) for routine post - op      Report consisted of patients Situation, Background, Assessment and   Recommendations(SBAR). Information from the following report(s) SBAR was reviewed with the receiving nurse. Opportunity for questions and clarification was provided. Assessment completed upon patients arrival to unit and care assumed.
24-Apr-2019 19:33

## 2022-03-19 PROBLEM — Z34.90 PREGNANCY: Status: ACTIVE | Noted: 2017-04-28

## 2022-03-20 PROBLEM — M48.02 CERVICAL STENOSIS OF SPINE: Status: ACTIVE | Noted: 2018-08-17

## 2022-06-11 NOTE — ROUTINE PROCESS
Bedside and Verbal shift change report given to Connie Urbina RN (oncoming nurse) by Supriya Ohara RN (offgoing nurse). Report included the following information SBAR, Procedure Summary, Intake/Output and MAR. stretcher

## 2023-05-19 RX ORDER — TOPIRAMATE 100 MG/1
100 TABLET, FILM COATED ORAL 2 TIMES DAILY
COMMUNITY

## 2023-05-19 RX ORDER — NALOXONE HYDROCHLORIDE 4 MG/.1ML
SPRAY NASAL
COMMUNITY
Start: 2018-08-17

## 2023-05-19 RX ORDER — OXYCODONE HYDROCHLORIDE 5 MG/1
5-10 TABLET ORAL EVERY 4 HOURS PRN
COMMUNITY
Start: 2018-08-17

## (undated) DEVICE — PACK PROCEDURE SURG C SECT KT SMH

## (undated) DEVICE — LARGE, DISPOSABLE ALEXIS O C-SECTION PROTECTOR - RETRACTOR: Brand: ALEXIS ® O C-SECTION PROTECTOR - RETRACTOR

## (undated) DEVICE — LAMINECTOMY RICHMOND-LF: Brand: MEDLINE INDUSTRIES, INC.

## (undated) DEVICE — DRAIN SURG W7MMXL20CM SIL FULL PERF HUBLESS FLAT RADPQ STRP

## (undated) DEVICE — TIP CLEANER: Brand: VALLEYLAB

## (undated) DEVICE — Device

## (undated) DEVICE — SOLIDIFIER MEDC 1200ML -- CONVERT TO 356117

## (undated) DEVICE — COVER,TABLE,HEAVY DUTY,60"X90",STRL: Brand: MEDLINE

## (undated) DEVICE — PENCIL ES L3M BTTN SWCH S STL HEX LOK BLDE ELECTRD HOLSTER

## (undated) DEVICE — (D)PREP SKN CHLRAPRP APPL 26ML -- CONVERT TO ITEM 371833

## (undated) DEVICE — STAPLER SKIN SQ 30 ABSRB STPL DISP INSORB

## (undated) DEVICE — DEVON™ KNEE AND BODY STRAP 60" X 3" (1.5 M X 7.6 CM): Brand: DEVON

## (undated) DEVICE — 3000CC GUARDIAN II: Brand: GUARDIAN

## (undated) DEVICE — SOLUTION IV 1000ML 0.9% SOD CHL

## (undated) DEVICE — STERILE POLYISOPRENE POWDER-FREE SURGICAL GLOVES WITH EMOLLIENT COATING: Brand: PROTEXIS

## (undated) DEVICE — SUTURE VCRL 2-0 L27IN ABSRB UD CP-2 L26MM 1/2 CIR REV CUT J869H

## (undated) DEVICE — BONE WAX WHITE: Brand: BONE WAX WHITE

## (undated) DEVICE — POOLE SUCTION INSTRUMENT WITH REMOVABLE SHEATH: Brand: POOLE

## (undated) DEVICE — SUTURE VCRL SZ 0 L36IN ABSRB VLT L40MM CT 1/2 CIR J358H

## (undated) DEVICE — SUTURE MCRYL SZ 0 L36IN ABSRB VLT L48MM CTX 1/2 CIR Y398H

## (undated) DEVICE — DRAPE SURG W41XL74IN CLR FULL SZ C ARM 3 ADH POLY STRP E

## (undated) DEVICE — CATH IV AUTOGRD BC GRN 18GA 30 -- INSYTE

## (undated) DEVICE — SUTURE MCRYL SZ 0 L36IN ABSRB UD L36MM CT-1 1/2 CIR Y946H

## (undated) DEVICE — DRAPE,LAPAROTOMY,T,PEDI,STERILE: Brand: MEDLINE

## (undated) DEVICE — 4-PORT MANIFOLD: Brand: NEPTUNE 2

## (undated) DEVICE — PREP SKN PREVAIL 40ML APPL --

## (undated) DEVICE — SPONGE: SPECIALTY PEANUT XR 100/CS: Brand: MEDICAL ACTION INDUSTRIES

## (undated) DEVICE — SCREW EXT FIX L14MM FOR DISTRCTN

## (undated) DEVICE — Z DISCONTINUED GLOVE SURG SZ 7 L12IN FNGR THK13MIL WHT ISOLEX POLYISOPRENE

## (undated) DEVICE — INSULATED BLADE ELECTRODE: Brand: EDGE

## (undated) DEVICE — SYR LR LCK 1ML GRAD NSAF 30ML --

## (undated) DEVICE — SOLUTION IV 250ML 0.9% SOD CHL CLR INJ FLX BG CONT PRT CLSR

## (undated) DEVICE — SUTURE MCRYL SZ 3-0 L27IN ABSRB UD L19MM PS-2 3/8 CIR PRIM Y427H

## (undated) DEVICE — NDL SPNE QNCKE 18GX3.5IN LF --

## (undated) DEVICE — BIPOLAR IRRIGATOR INTEGRATED TUBING AND BIPOLAR CORD SET, DISPOSABLE

## (undated) DEVICE — COVER,MAYO STAND,STERILE: Brand: MEDLINE

## (undated) DEVICE — INFECTION CONTROL KIT SYS

## (undated) DEVICE — GAUZE SPONGES,12 PLY: Brand: CURITY

## (undated) DEVICE — REM POLYHESIVE ADULT PATIENT RETURN ELECTRODE: Brand: VALLEYLAB

## (undated) DEVICE — MEDI-VAC NON-CONDUCTIVE SUCTION TUBING: Brand: CARDINAL HEALTH

## (undated) DEVICE — TOOL 14MH30 LEGEND 14CM 3MM: Brand: MIDAS REX ™

## (undated) DEVICE — ADHESIVE TISS DERMA FLEX 0.7ML -- HIGH VISCOSITY